# Patient Record
Sex: FEMALE | Race: WHITE | URBAN - METROPOLITAN AREA
[De-identification: names, ages, dates, MRNs, and addresses within clinical notes are randomized per-mention and may not be internally consistent; named-entity substitution may affect disease eponyms.]

---

## 2017-01-13 ENCOUNTER — OFFICE VISIT (OUTPATIENT)
Dept: ENDOCRINOLOGY | Facility: CLINIC | Age: 62
End: 2017-01-13

## 2017-01-13 VITALS
TEMPERATURE: 98.4 F | HEIGHT: 61 IN | WEIGHT: 201.8 LBS | BODY MASS INDEX: 38.1 KG/M2 | HEART RATE: 97 BPM | OXYGEN SATURATION: 93 % | DIASTOLIC BLOOD PRESSURE: 76 MMHG | SYSTOLIC BLOOD PRESSURE: 130 MMHG

## 2017-01-13 DIAGNOSIS — E66.01 MORBID OBESITY DUE TO EXCESS CALORIES (H): Primary | ICD-10-CM

## 2017-01-13 RX ORDER — TOPIRAMATE 25 MG/1
TABLET, FILM COATED ORAL
Qty: 90 TABLET | Refills: 5 | Status: SHIPPED | OUTPATIENT
Start: 2017-01-13 | End: 2017-04-24

## 2017-01-13 ASSESSMENT — PAIN SCALES - GENERAL: PAINLEVEL: SEVERE PAIN (7)

## 2017-01-13 NOTE — NURSING NOTE
"Chief Complaint   Patient presents with     Clinic Care Coordination - Follow-up     [facility]       Filed Vitals:    01/13/17 1226   BP: 130/76   Pulse: 97   Temp: 98.4  F (36.9  C)   TempSrc: Oral   Height: 5' 1\"   Weight: 201 lb 12.8 oz   SpO2: 93%       Body mass index is 38.15 kg/(m^2).    Kellie ALONZO LPN                       "

## 2017-01-13 NOTE — PROGRESS NOTES
Return Medical Weight Management Note     Elham Naik  MRN:  [id number]  :  1955  DENIZ:  2017    Dear Dr. Stevenson,     I had the pleasure of seeing your patient Elham Naik.  She is a 60 year old female who I am continuing to see for treatment of obesity related to: DM-2, Hypertension, Hyperlipidemia, GERD, Depression and kidney disease     CURRENT WEIGHT:   201 lbs 12.8 oz    Wt Readings from Last 4 Encounters:   17 91.536 kg (201 lb 12.8 oz)   16 91.445 kg (201 lb 9.6 oz)   16 86.682 kg (191 lb 1.6 oz)   16 86.592 kg (190 lb 14.4 oz)     Height:  5'2  Body Mass Index:  Body mass index is 38.15 kg/(m^2).  Vitals:  B/P: 104/62, P: 89    Initial consult weight was 211  Weight change since last seen on 1/15/2016 is up 10 pounds.   Total loss is 10 pounds.    INTERVAL HISTORY:  Needed help with losing 20 lbs to get on the kidney transplant list, and now says that she is on it, but her kidney function is stable without dialysis. Still tolerating Victoza 1.2 mg.  Occasional GI side effects that she thinks may be related to Victoza. She wants to lose more weight related to kidney diease status but also for general health. Does not now think that GLP-1 will be enough and would like to consider an additional medication.    Diet and Activity Changes Since Last Visit Reviewed With Patient 2017   I have made the following changes to my diet since my last visit: -   With regards to my diet, I am still struggling with: carbbs,sugars   For breakfast, I typically eat: 2 toast,1hard boiled egg   For lunch, I typically eat: cicken salad sandwich,some fish   For supper, I typically eat: it vaaries   For snack(s), I typically eat: choclate iceream cookis   I have made the following changes to my activity/exercise since my last visit: not much   With regards to my activity/exercise, I am still struggling with: walking joint pain       MEDICATIONS:   Current Outpatient Prescriptions    Medication     escitalopram (LEXAPRO) 5 MG/5ML solution     trolamine salicylate (ASPERCREME) 10 % cream     liraglutide (VICTOZA) 18 MG/3ML soln     insulin pen needle (B-D U/F) 31G X 5 MM     trolamine salicylate (ASPERCREME) 10 % cream     aspirin 81 MG tablet     CALCIUM CARBONATE PO     cholecalciferol 1000 UNITS TABS     codeine 15 MG tablet     hydrOXYzine (ATARAX) 50 MG tablet     INSULIN ASPART SC     insulin glargine (LANTUS) 100 UNIT/ML vial     levothyroxine (SYNTHROID, LEVOTHROID) 50 MCG tablet     meclizine (ANTIVERT) 12.5 MG tablet     MELATONIN PO     insulin aspart (NOVOLOG FLEXPEN) 100 UNIT/ML soln     insulin aspart (NOVOLOG FLEXPEN) 100 UNIT/ML soln     simvastatin (ZOCOR) 40 MG tablet     Metoprolol Succinate (TOPROL XL PO)     cetirizine (ZYRTEC ALLERGY) 10 MG tablet     No current facility-administered medications for this visit.       Weight Loss Medication History Reviewed With Patient 1/13/2017   Which weight loss medications are you currently taking on a regular basis?  Victoza (liraglutide)   Are you having any side effects from the weight loss medication that we have prescribed you? No   If you are having side effects please describe: -     ASSESSMENT:   Patient hoping for more weight loss. Continue Victoza 1.2 mg daily. Maintain Lantus and Novolog at current doses. Will trial addition of topiramate for support of food change - intent/AEs reviewed. Reinforced food plan - focus on no between meal snacking, aggressive lowering of starches and cheese    FOLLOW-UP:    12 weeks.  10/15 minutes spent on counseling and education    Sincerely,    Easton Trejo MD

## 2017-01-13 NOTE — Clinical Note
2017       RE: Elham Naik  [address]  [address] [place] [address]     Dear Colleague,    Thank you for referring your patient, Elham Naik, to the MEDICAL WEIGHT MANAGEMENT at [facility]. Please see a copy of my visit note below.        Return Medical Weight Management Note     Elham Naik  MRN:  [id number]  :  1955  DENIZ:  2017    Dear Dr. Stevenson,     I had the pleasure of seeing your patient Elham Naik.  She is a 60 year old female who I am continuing to see for treatment of obesity related to: DM-2, Hypertension, Hyperlipidemia, GERD, Depression and kidney disease     CURRENT WEIGHT:   201 lbs 12.8 oz    Wt Readings from Last 4 Encounters:   17 91.536 kg (201 lb 12.8 oz)   16 91.445 kg (201 lb 9.6 oz)   16 86.682 kg (191 lb 1.6 oz)   16 86.592 kg (190 lb 14.4 oz)     Height:  5'2  Body Mass Index:  Body mass index is 38.15 kg/(m^2).  Vitals:  B/P: 104/62, P: 89    Initial consult weight was 211  Weight change since last seen on 1/15/2016 is up 10 pounds.   Total loss is 10 pounds.    INTERVAL HISTORY:  Needed help with losing 20 lbs to get on the kidney transplant list, and now says that she is on it, but her kidney function is stable without dialysis. Still tolerating Victoza 1.2 mg.  Occasional GI side effects that she thinks may be related to Victoza. She wants to lose more weight related to kidney diease status but also for general health. Does not now think that GLP-1 will be enough and would like to consider an additional medication.    Diet and Activity Changes Since Last Visit Reviewed With Patient 2017   I have made the following changes to my diet since my last visit: -   With regards to my diet, I am still struggling with: carbbs,sugars   For breakfast, I typically eat: 2 toast,1hard boiled egg   For lunch, I typically eat: cicken salad sandwich,some fish   For supper, I  typically eat: it vaaries   For snack(s), I typically eat: choclate iceream cookis   I have made the following changes to my activity/exercise since my last visit: not much   With regards to my activity/exercise, I am still struggling with: walking joint pain       MEDICATIONS:   Current Outpatient Prescriptions   Medication     escitalopram (LEXAPRO) 5 MG/5ML solution     trolamine salicylate (ASPERCREME) 10 % cream     liraglutide (VICTOZA) 18 MG/3ML soln     insulin pen needle (B-D U/F) 31G X 5 MM     trolamine salicylate (ASPERCREME) 10 % cream     aspirin 81 MG tablet     CALCIUM CARBONATE PO     cholecalciferol 1000 UNITS TABS     codeine 15 MG tablet     hydrOXYzine (ATARAX) 50 MG tablet     INSULIN ASPART SC     insulin glargine (LANTUS) 100 UNIT/ML vial     levothyroxine (SYNTHROID, LEVOTHROID) 50 MCG tablet     meclizine (ANTIVERT) 12.5 MG tablet     MELATONIN PO     insulin aspart (NOVOLOG FLEXPEN) 100 UNIT/ML soln     insulin aspart (NOVOLOG FLEXPEN) 100 UNIT/ML soln     simvastatin (ZOCOR) 40 MG tablet     Metoprolol Succinate (TOPROL XL PO)     cetirizine (ZYRTEC ALLERGY) 10 MG tablet     No current facility-administered medications for this visit.       Weight Loss Medication History Reviewed With Patient 1/13/2017   Which weight loss medications are you currently taking on a regular basis?  Victoza (liraglutide)   Are you having any side effects from the weight loss medication that we have prescribed you? No   If you are having side effects please describe: -     ASSESSMENT:   Patient hoping for more weight loss. Continue Victoza 1.2 mg daily. Maintain Lantus and Novolog at current doses. Will trial addition of topiramate for support of food change - intent/AEs reviewed. Reinforced food plan - focus on no between meal snacking, aggressive lowering of starches and cheese    FOLLOW-UP:    12 weeks.  10/15 minutes spent on counseling and education    Sincerely,      Easton Trejo MD

## 2017-01-13 NOTE — PATIENT INSTRUCTIONS
Follow up in 3 months with Dr. Trejo   MEDICATION STARTED AT THIS APPOINTMENT  We are starting topiramate at bedtime.  Start one tab, 25 mg, for a week. Go up to 50 mg (2 tabs) for the next week. At the third week, take   3 tabs (75 mg).  Stay at 3 tabs until you are seen again. Call the nurse at [phone] if you have any questions or concerns. (Do not stop taking it if you don't think it's working. For some people it works even though they do not feel much different.)    Topiramate (Topamax) is a medication that is used most often to treat migraine headaches or for seizures. It has also been found to help with weight loss. Although it's not currently FDA approved for weight loss, it has been used safely for a number of years to help people who are carrying extra weight.     Just how topiramate helps with weight loss has not been exactly determined. However it seems to work on areas of the brain to quiet down signals related to eating.      Topiramate may make you:    >feel less interest in eating in between meals   >think less about food and eating   >find it easier to push the plate away   >find giving up pop easier    >have an easier time eating less    For some of our patients, the pills work right away. They feel and think quite differently about food. Other patients don't feel much of a change but find in fact they have lost weight! Like all weight loss medications, topiramate works best when you help it work.  This means:    1) Have less tempting high calorie (fattening) food around the house or office    2) Have lower calorie food (fruits, vegetables,low fat meats and dairy) for snacks    3) Eat out only one time or less each week.   4) Eat your meals at a table with the TV or computer off.    Side-effects. Topiramate is generally well tolerated. The main side-effects we see are:   Tingling in hands,feet, or face (usually not very troublesome)   Mental confusion and word finding trouble (about 10% of  patients have this.)     Feeling sleepy or a bit dopey- this goes away very soon after starting.    One of the dangers of topiramate is the possibility of birth defects--if you get pregnant when you are on it, there is the risk that your baby will be born with a cleft lip or palate.  If you are on topiramate and of child bearing age, you need to be on a reliable form of birth control or refrain from sexual intercourse.     Please refer to the pharmacy insert for more information on side-effects. Since many pharmacists are not familiar with the use of topiramate in weight loss, calling the clinic will get you the most accurate information on the use of this medication for weight loss.     In order to get refills of this or any medication we prescribe you must be seen in the medical weight mgmt clinic every 2-3 months. Please have your pharmacy fax a refill request to [phone].

## 2017-02-01 ENCOUNTER — TRANSFERRED RECORDS (OUTPATIENT)
Dept: HEALTH INFORMATION MANAGEMENT | Facility: CLINIC | Age: 62
End: 2017-02-01

## 2017-04-24 ENCOUNTER — OFFICE VISIT (OUTPATIENT)
Dept: ENDOCRINOLOGY | Facility: CLINIC | Age: 62
End: 2017-04-24

## 2017-04-24 VITALS
BODY MASS INDEX: 37.91 KG/M2 | HEART RATE: 104 BPM | HEIGHT: 61 IN | DIASTOLIC BLOOD PRESSURE: 84 MMHG | OXYGEN SATURATION: 97 % | TEMPERATURE: 98.1 F | WEIGHT: 200.8 LBS | SYSTOLIC BLOOD PRESSURE: 156 MMHG

## 2017-04-24 DIAGNOSIS — E66.01 MORBID OBESITY, UNSPECIFIED OBESITY TYPE (H): ICD-10-CM

## 2017-04-24 DIAGNOSIS — E66.01 MORBID OBESITY DUE TO EXCESS CALORIES (H): ICD-10-CM

## 2017-04-24 RX ORDER — LIRAGLUTIDE 6 MG/ML
INJECTION SUBCUTANEOUS
Qty: 9 ML | Refills: 5 | Status: SHIPPED | OUTPATIENT
Start: 2017-04-24

## 2017-04-24 RX ORDER — TOPIRAMATE 25 MG/1
50 TABLET, FILM COATED ORAL 2 TIMES DAILY
Qty: 120 TABLET | Refills: 5 | Status: SHIPPED | OUTPATIENT
Start: 2017-04-24

## 2017-04-24 ASSESSMENT — PAIN SCALES - GENERAL: PAINLEVEL: EXTREME PAIN (8)

## 2017-04-24 NOTE — LETTER
2017       RE: Elham Naik  [address]  [address] [place] [address]     Dear Colleague,    Thank you for referring your patient, Elham Naik, to the [facility] MEDICAL WEIGHT MANAGEMENT at [facility]. Please see a copy of my visit note below.        Return Medical Weight Management Note     Elham Naik  MRN:  [id number]  :  1955  DENIZ:  2017    Dear Dr. Stevenson,     I had the pleasure of seeing your patient Elham Naik.  She is a 60 year old female who I am continuing to see for treatment of obesity related to: DM-2, Hypertension, Hyperlipidemia, GERD, Depression and kidney disease     CURRENT WEIGHT:   200 lbs 12.8 oz    Wt Readings from Last 4 Encounters:   17 91.1 kg (200 lb 12.8 oz)   17 91.5 kg (201 lb 12.8 oz)   16 91.4 kg (201 lb 9.6 oz)   16 86.7 kg (191 lb 1.6 oz)     Height:  5'2  Body Mass Index:  Body mass index is 37.94 kg/(m^2).  Vitals:  B/P: 104/62, P: 89    Initial consult weight was 211  Weight change since last seen on 2017 is down 1 pounds.   Total loss is 11 pounds.    INTERVAL HISTORY:  Needed help with losing 20 lbs to get on the kidney transplant list, and now says that she is on it, but her kidney function is stable without dialysis. Now tolerating Victoza 1.8 mg.  Has not been able to change her food much as is being told has to eat to catch up with her insulin. She wants to lose more weight related to kidney diease status but also for general health.     Diet and Activity Changes Since Last Visit Reviewed With Patient 2017   I have made the following changes to my diet since my last visit: Less sugar, less fat   With regards to my diet, I am still struggling with: Cravings   For breakfast, I typically eat: 2 pieces toast   For lunch, I typically eat: Grilled cheese sandwich, chicken salad croissant   For supper, I typically eat: Fish, chicken   For snack(s), I  typically eat: Ritz crackers - 2 or 3, occasional candy bar   I have made the following changes to my activity/exercise since my last visit: None   With regards to my activity/exercise, I am still struggling with: Pain in knees and low back       MEDICATIONS:   Current Outpatient Prescriptions   Medication     topiramate (TOPAMAX) 25 MG tablet     escitalopram (LEXAPRO) 5 MG/5ML solution     trolamine salicylate (ASPERCREME) 10 % cream     liraglutide (VICTOZA) 18 MG/3ML soln     insulin pen needle (B-D U/F) 31G X 5 MM     trolamine salicylate (ASPERCREME) 10 % cream     aspirin 81 MG tablet     CALCIUM CARBONATE PO     cholecalciferol 1000 UNITS TABS     codeine 15 MG tablet     hydrOXYzine (ATARAX) 50 MG tablet     INSULIN ASPART SC     insulin glargine (LANTUS) 100 UNIT/ML vial     levothyroxine (SYNTHROID, LEVOTHROID) 50 MCG tablet     meclizine (ANTIVERT) 12.5 MG tablet     MELATONIN PO     insulin aspart (NOVOLOG FLEXPEN) 100 UNIT/ML soln     insulin aspart (NOVOLOG FLEXPEN) 100 UNIT/ML soln     simvastatin (ZOCOR) 40 MG tablet     Metoprolol Succinate (TOPROL XL PO)     cetirizine (ZYRTEC ALLERGY) 10 MG tablet     No current facility-administered medications for this visit.        Weight Loss Medication History Reviewed With Patient 4/24/2017   Which weight loss medications are you currently taking on a regular basis?  Topamax (topiramate), Victoza (liraglutide)   Are you having any side effects from the weight loss medication that we have prescribed you? No   If you are having side effects please describe: -     ASSESSMENT:   Patient hoping for more weight loss. Continue Victoza 1.8 mg daily. Maintain Lantus at current dose and stop Novolog now that is at full dose of liraglutide. Will increase topiramate to 50 BID for support of food change. Reinforced food plan - focus on no between meal snacking, aggressive lowering of starches and cheese.    FOLLOW-UP:    12 weeks.  10/15 minutes spent on counseling and  education    Sincerely,    Easton Trejo MD

## 2017-04-24 NOTE — NURSING NOTE
"(   Chief Complaint   Patient presents with     Weight Loss     follow up for weight management    )    ( Weight: 200 lb 12.8 oz )  ( Height: 5' 1\" )  ( BMI (Calculated): 38.02 )  (   )  (   )  (   )  (   )  (   )  (   )    ( BP: 156/84 )  (   )  ( Temp: 98.1  F (36.7  C) )  ( Temp src: Oral )  ( Pulse: 104 )  (   )  ( SpO2: 97 % )    (   Patient Active Problem List   Diagnosis     CKD (chronic kidney disease) stage 4, GFR 15-29 ml/min (H)     Diabetes mellitus, type 2 (H)     Hypothyroidism     Vitamin D deficiency     HTN (hypertension)     Depression    )  (   Current Outpatient Prescriptions   Medication Sig Dispense Refill     topiramate (TOPAMAX) 25 MG tablet 25mg at bedtime for week 1, 50mg at bedtime for 1 week, and 75mg at bedtime thereafter 90 tablet 5     escitalopram (LEXAPRO) 5 MG/5ML solution Take 10 mg by mouth daily       trolamine salicylate (ASPERCREME) 10 % cream        liraglutide (VICTOZA) 18 MG/3ML soln 1.2 MG Daily 9 mL 4     insulin pen needle (B-D U/F) 31G X 5 MM Use 1 pen needles daily or as directed with Victoza 100 each 1     trolamine salicylate (ASPERCREME) 10 % cream Apply topically as needed for moderate pain       aspirin 81 MG tablet Take 81 mg by mouth daily       CALCIUM CARBONATE PO Take by mouth as needed       cholecalciferol 1000 UNITS TABS Take 1,000 Units by mouth daily       codeine 15 MG tablet Take 15 mg by mouth every 8 hours as needed for moderate to severe pain       hydrOXYzine (ATARAX) 50 MG tablet Take 50 mg by mouth every 6 hours as needed for anxiety       INSULIN ASPART SC Inject 3 Units Subcutaneous as needed for high blood sugar       insulin glargine (LANTUS) 100 UNIT/ML vial Inject 38 Units Subcutaneous At Bedtime        levothyroxine (SYNTHROID, LEVOTHROID) 50 MCG tablet Take by mouth daily       meclizine (ANTIVERT) 12.5 MG tablet Take 12.5 mg by mouth 3 times daily as needed for dizziness       MELATONIN PO Take 6 mg by mouth At Bedtime       insulin " aspart (NOVOLOG FLEXPEN) 100 UNIT/ML soln Inject 16 Units Subcutaneous 2 times daily (with meals) Per sliding scale       insulin aspart (NOVOLOG FLEXPEN) 100 UNIT/ML soln Inject 18 Units Subcutaneous every evening       simvastatin (ZOCOR) 40 MG tablet Take by mouth At Bedtime       Metoprolol Succinate (TOPROL XL PO) Take 50 mg by mouth       cetirizine (ZYRTEC ALLERGY) 10 MG tablet Take 20 mg by mouth At Bedtime      )  ( Diabetes Eval:    )    ( Pain Eval:  Extreme Pain (8) )    (  History   Smoking Status     Never Smoker   Smokeless Tobacco     Not on file    )    ( Signed By:  Vanna Nelson; April 24, 2017; 10:50 AM )

## 2017-04-24 NOTE — MR AVS SNAPSHOT
After Visit Summary   2017    Elham Naik    MRN: [id number]           Patient Information     Date Of Birth          1955        Visit Information        Provider Department      2017 10:45 AM Easton Trejo MD [facility] Medical Weight Management        Today's Diagnoses     Morbid obesity due to excess calories (H)        Morbid obesity, unspecified obesity type (H)           Follow-ups after your visit        Follow-up notes from your care team     Return in about 3 months (around 2017).      Who to contact     Please call your clinic at [phone] to:    Ask questions about your health    Make or cancel appointments    Discuss your medicines    Learn about your test results    Speak to your doctor   If you have compliments or concerns about an experience at your clinic, or if you wish to file a complaint, please contact [facility] Patient Relations at [phone] or email us at [email or web address]         Additional Information About Your Visit        MyChart Information     S[employer]y is an electronic gateway that provides easy, online access to your medical records. With [employer]ells[employer], you can request a clinic appointment, read your test results, renew a prescription or communicate with your care team.     To sign up for [email or web address] visit the website at [email or web address]   You will be asked to enter the access code listed below, as well as some personal information. Please follow the directions to create your username and password.     Your access code is: [email or web address]  Expires: 2017 11:34 AM     Your access code will  in 90 days. If you need help or a new code, please contact your [facility] or call [phone] for assistance.        Care EveryWhere ID     This is your Care EveryWhere ID. This could be used by other organizations to access your [facility]  "records  [id number]        Your Vitals Were     Pulse Temperature Height Last Period Pulse Oximetry BMI (Body Mass Index)    104 98.1  F (36.7  C) (Oral) 1.549 m (5' 1\") (LMP Unknown) 97% 37.94 kg/m2       Blood Pressure from Last 3 Encounters:   04/24/17 156/84   01/13/17 130/76   12/19/16 141/73    Weight from Last 3 Encounters:   04/24/17 91.1 kg (200 lb 12.8 oz)   01/13/17 91.5 kg (201 lb 12.8 oz)   12/19/16 91.4 kg (201 lb 9.6 oz)              Today, you had the following     No orders found for display         Today's Medication Changes          These changes are accurate as of: 4/24/17 11:34 AM.  If you have any questions, ask your nurse or doctor.               These medicines have changed or have updated prescriptions.        Dose/Directions    liraglutide 18 MG/3ML soln   Commonly known as:  VICTOZA   This may have changed:  additional instructions   Used for:  Morbid obesity, unspecified obesity type (H)   Changed by:  Easton Trejo MD        1.8 MG Daily   Quantity:  9 mL   Refills:  5       topiramate 25 MG tablet   Commonly known as:  TOPAMAX   This may have changed:    - how much to take  - how to take this  - when to take this  - additional instructions   Used for:  Morbid obesity due to excess calories (H)   Changed by:  Easton Trejo MD        Dose:  50 mg   Take 2 tablets (50 mg) by mouth 2 times daily   Quantity:  120 tablet   Refills:  5            Where to get your medicines      These medications were sent to [facility] [place] - [address] [address]  [address] [address] [place] [address]     Phone:  [phone]     liraglutide 18 MG/3ML soln    topiramate 25 MG tablet                Primary Care Provider Office Phone # Fax #    Easton Trejo [phone]        [facility] [address] [facility] [address]  [facility] [address]        Thank you!     Thank you for choosing [facility] WEIGHT MANAGEMENT  for your care. " Our goal is always to provide you with excellent care. Hearing back from our patients is one way we can continue to improve our services. Please take a few minutes to complete the written survey that you may receive in the mail after your visit with us. Thank you!             Your Updated Medication List - Protect others around you: Learn how to safely use, store and throw away your medicines at www.disposemymeds.org.          This list is accurate as of: 4/24/17 11:34 AM.  Always use your most recent med list.                   Brand Name Dispense Instructions for use    aspirin 81 MG tablet      Take 81 mg by mouth daily       CALCIUM CARBONATE PO      Take by mouth as needed       cholecalciferol 1000 UNITS Tabs      Take 1,000 Units by mouth daily       codeine 15 MG tablet      Take 15 mg by mouth every 8 hours as needed for moderate to severe pain       escitalopram 5 MG/5ML solution    LEXAPRO     Take 10 mg by mouth daily       hydrOXYzine 50 MG tablet    ATARAX     Take 50 mg by mouth every 6 hours as needed for anxiety       INSULIN ASPART SC      Inject 3 Units Subcutaneous as needed for high blood sugar       insulin glargine 100 UNIT/ML injection    LANTUS     Inject 38 Units Subcutaneous At Bedtime       insulin pen needle 31G X 5 MM    B-D U/F    100 each    Use 1 pen needles daily or as directed with Victoza       levothyroxine 50 MCG tablet    SYNTHROID/LEVOTHROID     Take by mouth daily       liraglutide 18 MG/3ML soln    VICTOZA    9 mL    1.8 MG Daily       meclizine 12.5 MG tablet    ANTIVERT     Take 12.5 mg by mouth 3 times daily as needed for dizziness       MELATONIN PO      Take 6 mg by mouth At Bedtime       simvastatin 40 MG tablet    ZOCOR     Take by mouth At Bedtime       topiramate 25 MG tablet    TOPAMAX    120 tablet    Take 2 tablets (50 mg) by mouth 2 times daily       TOPROL XL PO      Take 50 mg by mouth       * trolamine salicylate 10 % cream    ASPERCREME     Apply topically  as needed for moderate pain       * trolamine salicylate 10 % cream    ASPERCREME         ZYRTEC ALLERGY 10 MG tablet   Generic drug:  cetirizine      Take 20 mg by mouth At Bedtime       * Notice:  This list has 2 medication(s) that are the same as other medications prescribed for you. Read the directions carefully, and ask your doctor or other care provider to review them with you.

## 2017-04-24 NOTE — PROGRESS NOTES
Return Medical Weight Management Note     Elham Naik  MRN:  [id number]  :  1955  DENIZ:  2017    Dear Dr. Stevenson,     I had the pleasure of seeing your patient Elham Naik.  She is a 60 year old female who I am continuing to see for treatment of obesity related to: DM-2, Hypertension, Hyperlipidemia, GERD, Depression and kidney disease     CURRENT WEIGHT:   200 lbs 12.8 oz    Wt Readings from Last 4 Encounters:   17 91.1 kg (200 lb 12.8 oz)   17 91.5 kg (201 lb 12.8 oz)   16 91.4 kg (201 lb 9.6 oz)   16 86.7 kg (191 lb 1.6 oz)     Height:  5'2  Body Mass Index:  Body mass index is 37.94 kg/(m^2).  Vitals:  B/P: 104/62, P: 89    Initial consult weight was 211  Weight change since last seen on 2017 is down 1 pounds.   Total loss is 11 pounds.    INTERVAL HISTORY:  Needed help with losing 20 lbs to get on the kidney transplant list, and now says that she is on it, but her kidney function is stable without dialysis. Now tolerating Victoza 1.8 mg.  Has not been able to change her food much as is being told has to eat to catch up with her insulin. She wants to lose more weight related to kidney diease status but also for general health.     Diet and Activity Changes Since Last Visit Reviewed With Patient 2017   I have made the following changes to my diet since my last visit: Less sugar, less fat   With regards to my diet, I am still struggling with: Cravings   For breakfast, I typically eat: 2 pieces toast   For lunch, I typically eat: Grilled cheese sandwich, chicken salad croissant   For supper, I typically eat: Fish, chicken   For snack(s), I typically eat: Ritz crackers - 2 or 3, occasional candy bar   I have made the following changes to my activity/exercise since my last visit: None   With regards to my activity/exercise, I am still struggling with: Pain in knees and low back       MEDICATIONS:   Current Outpatient Prescriptions   Medication     topiramate  (TOPAMAX) 25 MG tablet     escitalopram (LEXAPRO) 5 MG/5ML solution     trolamine salicylate (ASPERCREME) 10 % cream     liraglutide (VICTOZA) 18 MG/3ML soln     insulin pen needle (B-D U/F) 31G X 5 MM     trolamine salicylate (ASPERCREME) 10 % cream     aspirin 81 MG tablet     CALCIUM CARBONATE PO     cholecalciferol 1000 UNITS TABS     codeine 15 MG tablet     hydrOXYzine (ATARAX) 50 MG tablet     INSULIN ASPART SC     insulin glargine (LANTUS) 100 UNIT/ML vial     levothyroxine (SYNTHROID, LEVOTHROID) 50 MCG tablet     meclizine (ANTIVERT) 12.5 MG tablet     MELATONIN PO     insulin aspart (NOVOLOG FLEXPEN) 100 UNIT/ML soln     insulin aspart (NOVOLOG FLEXPEN) 100 UNIT/ML soln     simvastatin (ZOCOR) 40 MG tablet     Metoprolol Succinate (TOPROL XL PO)     cetirizine (ZYRTEC ALLERGY) 10 MG tablet     No current facility-administered medications for this visit.        Weight Loss Medication History Reviewed With Patient 4/24/2017   Which weight loss medications are you currently taking on a regular basis?  Topamax (topiramate), Victoza (liraglutide)   Are you having any side effects from the weight loss medication that we have prescribed you? No   If you are having side effects please describe: -     ASSESSMENT:   Patient hoping for more weight loss. Continue Victoza 1.8 mg daily. Maintain Lantus at current dose and stop Novolog now that is at full dose of liraglutide. Will increase topiramate to 50 BID for support of food change. Reinforced food plan - focus on no between meal snacking, aggressive lowering of starches and cheese.    FOLLOW-UP:    12 weeks.  10/15 minutes spent on counseling and education    Sincerely,    Easton Trejo MD

## 2017-05-02 ENCOUNTER — TRANSFERRED RECORDS (OUTPATIENT)
Dept: HEALTH INFORMATION MANAGEMENT | Facility: CLINIC | Age: 62
End: 2017-05-02

## 2017-05-02 ENCOUNTER — TELEPHONE (OUTPATIENT)
Dept: ENDOCRINOLOGY | Facility: CLINIC | Age: 62
End: 2017-05-02

## 2017-05-02 DIAGNOSIS — E11.65 TYPE 2 DIABETES MELLITUS WITH HYPERGLYCEMIA, UNSPECIFIED LONG TERM INSULIN USE STATUS: Primary | ICD-10-CM

## 2017-05-02 NOTE — TELEPHONE ENCOUNTER
Nurse from care facility called with concerns that patient is not being truthful during visit of food intake. States they find many candy/ chocolate wrappers in her garbage. She also eats out often too. Blood glucose has been ranging from 187 - 310 since Novalog was discontinued. Encouraged Nurse to make appointment to follow up with endocrinologist.

## 2017-05-05 NOTE — TELEPHONE ENCOUNTER
HIMANSHU Trejo:  Restart Novolog 3 units TID with meals. Continue Victoza 1.8 mg daily.       Spoke to Tana at NH and orders given. New Rx sent to pharmacy per [facility] nurse protocol/Dr. Trejo.  Tana Craig RN, BSN

## 2017-06-17 ENCOUNTER — HEALTH MAINTENANCE LETTER (OUTPATIENT)
Age: 62
End: 2017-06-17

## 2017-08-18 ENCOUNTER — MEDICAL CORRESPONDENCE (OUTPATIENT)
Dept: TRANSPLANT | Facility: CLINIC | Age: 62
End: 2017-08-18

## 2017-09-18 ENCOUNTER — TELEPHONE (OUTPATIENT)
Dept: TRANSPLANT | Facility: CLINIC | Age: 62
End: 2017-09-18

## 2017-09-18 NOTE — TELEPHONE ENCOUNTER
Coordinator spoke with pt. Pt reports she is now over 200 lbs. Reviewed that when she met with  Dr. De Dios on 12/19/16 he listed her goal weight to be 185 lbs. Pt states she was seen by our weight management clinic but the physician took her off her insulin medications and her A1C went above 8 and gained weight. Pt does not want to be seen at our weight management clinic again. Explained that pt is not required to go to our weight management clinic. Pt will need to show some weight loss progression in the next 6 months or she status will be discussed at our kidney selection committee. Asked pt to call coordinator if she meets her goal weight prior to 6 months. Pt states she is not in a wheel chair and never has been. Reviewed once weight gain is obtained she will need to complete other items for activation consideration (currently: nephrology, mammogram, GI f/u per Dr. De Dios for chronic diarrhea, SW and complete an updated HLA). Confirmed pt's address.    Spoke with NYDIA Rose at pt's nursing home. Pt's last weight was taken on 08/12/17 and was 207.6lbs.  Reviewed above information that was reviewed with pt.    BMI letter to be mailed to pt.

## 2017-10-06 ENCOUNTER — TRANSFERRED RECORDS (OUTPATIENT)
Dept: HEALTH INFORMATION MANAGEMENT | Facility: CLINIC | Age: 62
End: 2017-10-06

## 2017-10-12 ENCOUNTER — OFFICE VISIT (OUTPATIENT)
Dept: OPHTHALMOLOGY | Facility: CLINIC | Age: 62
End: 2017-10-12
Attending: OPHTHALMOLOGY
Payer: MEDICARE

## 2017-10-12 DIAGNOSIS — H26.491 RIGHT POSTERIOR CAPSULAR OPACIFICATION: ICD-10-CM

## 2017-10-12 DIAGNOSIS — H35.372 EPIRETINAL MEMBRANE (ERM) OF LEFT EYE: Primary | ICD-10-CM

## 2017-10-12 DIAGNOSIS — Z96.1 PSEUDOPHAKIA OF BOTH EYES: ICD-10-CM

## 2017-10-12 PROCEDURE — 92015 DETERMINE REFRACTIVE STATE: CPT | Mod: GY,ZF

## 2017-10-12 PROCEDURE — 76519 ECHO EXAM OF EYE: CPT | Mod: ZF | Performed by: OPHTHALMOLOGY

## 2017-10-12 PROCEDURE — 99213 OFFICE O/P EST LOW 20 MIN: CPT | Mod: ZF

## 2017-10-12 ASSESSMENT — VISUAL ACUITY
CORRECTION_TYPE: GLASSES
OS_CC: 20/50
METHOD: SNELLEN - LINEAR
OD_CC: 20/40

## 2017-10-12 ASSESSMENT — REFRACTION_WEARINGRX
OS_CYLINDER: +1.25
OS_SPHERE: -0.75
OS_AXIS: 039
OD_CYLINDER: +1.00
OD_SPHERE: -0.50
OD_AXIS: 150

## 2017-10-12 ASSESSMENT — TONOMETRY
OD_IOP_MMHG: 14
OS_IOP_MMHG: 17
IOP_METHOD: TONOPEN

## 2017-10-12 ASSESSMENT — REFRACTION_MANIFEST
OD_AXIS: 150
OD_SPHERE: -0.50
OS_ADD: +3.00
OS_SPHERE: -1.00
OD_CYLINDER: +1.00
OD_ADD: +3.00
OS_CYLINDER: +1.25
OS_AXIS: 040

## 2017-10-12 ASSESSMENT — SLIT LAMP EXAM - LIDS
COMMENTS: NORMAL
COMMENTS: NORMAL

## 2017-10-12 ASSESSMENT — CUP TO DISC RATIO: OD_RATIO: 0.3

## 2017-10-12 ASSESSMENT — CONF VISUAL FIELD
OS_NORMAL: 1
OD_NORMAL: 1

## 2017-10-12 ASSESSMENT — EXTERNAL EXAM - LEFT EYE: OS_EXAM: NORMAL

## 2017-10-12 ASSESSMENT — EXTERNAL EXAM - RIGHT EYE: OD_EXAM: NORMAL

## 2017-10-12 NOTE — PROGRESS NOTES
A 62 year F  Referred by Dr. Silva for possible IOL exchange OD    HPI: history of worsening vision likely in OD since . Dr. Silva attempted yag capsulotomy OD recently without any benefit.    history of CATARACT EXTRACTION with IOL OD 7 yrs prior  S/p yag capsulotomy OD  history of Diabetes mellitus s/p focal laser OD and PRP both eyes      Medications: AT as needed    Assessment and plan:    Pseudophakia both eyes  Glistening's OD  S/p yag capsulotomy OD  History of Diabetes mellitus s/p focal laser OD and PRP both eyes    Wondering if  vision is due macular issues or glistenings (infact vision in OD better than OS)  Will discuss this with her retina doc  It will be a high risk surgery, s/p capsulotomy    RETURN TO CLINIC in 1 month    SHANTI Singh  Cornea fellow

## 2017-10-12 NOTE — LETTER
10/12/2017       RE: Elham Naik  [address]  [address] [place] [address]     Dear Colleagues,    Thank you for referring your patient, Elham Naik, to the EYE CLINIC at [facility]. Please see a copy of my visit note below.    ~~~~~~~~~~~~~~~~~~~~~~~~~~~~~~~~~~~~~~~~~~~~~~~~~~~~~~~~~      A 62 year F  Referred by Dr. Silva for possible IOL exchange right eye.  Seen by Dr. Jacques for proliferative diabetic retinopathy.    HPI: history of worsening vision  OD since April'17. Dr. Silva attempted yag capsulotomy OD recently without any benefit.    history of CATARACT EXTRACTION with IOL OD 7 yrs prior  S/p yag capsulotomy right eye - no improvement in vision  history of Diabetes mellitus s/p focal laser OD and PRP both eyes      A/P    Speak with Dr. Jacques re: level of severity of Diabetic retinopathy against acuity measurements  Counseled re: complexity of intraocular lens exchange & possible risks    Acuity very similar between right eye and left eye despite heavy asymmetry in glistenings right eye > left eye ; capsulotomy present; intraocular lens in place 7 years all give me pause    Biometry today    Will consult with Dr. Jacques and reassess with patient      ~~~~~~~~~~~~~~~~~~~~~~~~~~~~~~~~~~~~~~~~~~~~~~~~~~~~~~~~~~~~~~~~    With her acuity at 20/40 in the right eye and 20/50 in the left eye, the presence of focal laser scars in the macula right eye and advanced proliferative diabetic retinopathy both eyes, I'm not sure how visually significant the intraocular lens glistenings really are.  They are markedly asymmetric between the two eyes despite the acuities being very similar.  I have reservations about the risk of an intraocular lens explant / exchange so I hope you might be able to weigh in on her visual potential, macular function, etc.    Please give me a call at the number below at your convenience to discuss.          Again, thank  you for allowing me to participate in the care of your patient.      Sincerely,        Keith Lane MD, MA  Director, Cornea & Anterior Segment  Director, Fellowship in Cornea & External Disease  [facility] Department of Ophthalmology & Visual Neuroscience  : Radha Florian [phone]  Email: [email or web address]  Mobile: [phone]

## 2017-10-12 NOTE — PROGRESS NOTES
62 year old F    Vision changes right eye since April    History Diabetic retinopathy with injections and laser (PDR s/p Panretinal laser photocoagulation (PRP), focal laser right eye )    S/p phacoemulsification with intraocular lens approx 7 years ago    S/p yag cap right eye   S/p intraocular lens opacity YAG by Dr. Silva - no change    A/P    Speak with Dr. Jacques re: level of severity of Diabetic retinopathy against acuity measurements  Counseled re: complexity of intraocular lens exchange & possible risks    Acuity very similar between right eye and left eye despite heavy asymmetry in glistenings right eye > left eye ; capsulotomy present; intraocular lens in place 7 years all give me pause    Biometry today    Will consult with Dr. Jacques and reassess with patient      ~~~~~~~~~~~~~~~~~~~~~~~~~~~~~~~~~~~~~~~~~~~~~~~~~~~~~~~~~~~~~~~~    Complete documentation of historical and exam elements from today's encounter can be found in the full encounter summary report (not reduplicated in this progress note). I personally obtained the chief complaint(s) and history of present illness.  I confirmed and edited as necessary the review of systems, past medical/surgical history, family history, social history, and examination findings as documented by others.  I examined the patient myself, and I personally reviewed the relevant tests, images, and reports as documented above. I formulated and edited as necessary the assessment and plan and discussed the findings and management plan with the patient and family.     I personally interpreted the diagnostic / imaging study and have edited the interpretation as needed.    Keith Lane MD, MA  Director, Cornea & Anterior Segment  [facility] Department of Ophthalmology & Visual Neuroscience

## 2017-10-12 NOTE — MR AVS SNAPSHOT
After Visit Summary   10/12/2017    Elham Naik    MRN: [id number]           Patient Information     Date Of Birth          1955        Visit Information        Provider Department      10/12/2017 1:30 PM Keith Lane MD Eye Clinic        Today's Diagnoses     Epiretinal membrane (ERM) of left eye - Left Eye    -  1    Pseudophakia of both eyes - Both Eyes        Right posterior capsular opacification - Right Eye           Follow-ups after your visit        Who to contact     Please call your clinic at [phone] to:    Ask questions about your health    Make or cancel appointments    Discuss your medicines    Learn about your test results    Speak to your doctor   If you have compliments or concerns about an experience at your clinic, or if you wish to file a complaint, please contact [facility] Patient Relations at [phone] or email us at [email or web address]         Additional Information About Your Visit        MyChart Information     [employer] is an electronic gateway that provides easy, online access to your medical records. With [employer], you can request a clinic appointment, read your test results, renew a prescription or communicate with your care team.     To sign up for [employer] visit the website at [email or web address]   You will be asked to enter the access code listed below, as well as some personal information. Please follow the directions to create your username and password.     Your access code is: [email or web address]  Expires: 2018  6:31 AM     Your access code will  in 90 days. If you need help or a new code, please contact your [facility] or call [phone] for assistance.        Care EveryWhere ID     This is your Care EveryWhere ID. This could be used by other organizations to access your [facility] medical records  [id number]        Your Vitals Were     Last Period                    (LMP Unknown)            Blood Pressure from Last 3 Encounters:   04/24/17 156/84   01/13/17 130/76   12/19/16 141/73    Weight from Last 3 Encounters:   08/12/17 94.2 kg (207 lb 9.6 oz)   04/24/17 91.1 kg (200 lb 12.8 oz)   01/13/17 91.5 kg (201 lb 12.8 oz)              We Performed the Following     IOL Biometry w/ IOL calc OU (both eye)        Primary Care Provider    None Specified       No primary provider on file.        Equal Access to Services     [facility] : Hadii vicente Lock, waalleyda luqadaha, qaybta kaalmaclau agee, vanessa carlisle . So [facility] [phone].    ATENCIÓN: Si habla español, tiene a watters disposición servicios gratuitos de asistencia lingüística. Llame al [phone].    We comply with applicable federal civil rights laws and [place] laws. We do not discriminate on the basis of race, color, national origin, age, disability, sex, sexual orientation, or gender identity.            Thank you!     Thank you for choosing EYE CLINIC  for your care. Our goal is always to provide you with excellent care. Hearing back from our patients is one way we can continue to improve our services. Please take a few minutes to complete the written survey that you may receive in the mail after your visit with us. Thank you!             Your Updated Medication List - Protect others around you: Learn how to safely use, store and throw away your medicines at www.disposemymeds.org.          This list is accurate as of: 10/12/17 11:59 PM.  Always use your most recent med list.                   Brand Name Dispense Instructions for use Diagnosis    aspirin 81 MG tablet      Take 81 mg by mouth daily        CALCIUM CARBONATE PO      Take by mouth as needed        cholecalciferol 1000 UNITS Tabs      Take 1,000 Units by mouth daily        codeine 15 MG tablet      Take 15 mg by mouth every 8 hours as needed for moderate to severe pain        escitalopram 5 MG/5ML solution    LEXAPRO      Take 10 mg by mouth daily        hydrOXYzine 50 MG tablet    ATARAX     Take 50 mg by mouth every 6 hours as needed for anxiety        * INSULIN ASPART SC      Inject 3 Units Subcutaneous as needed for high blood sugar        * insulin aspart 100 UNIT/ML injection    NovoLOG FLEXPEN    3 mL    Inject 3 Units Subcutaneous 3 times daily (with meals) Per sliding scale    Type 2 diabetes mellitus with hyperglycemia, unspecified long term insulin use status (H)       insulin glargine 100 UNIT/ML injection    LANTUS     Inject 38 Units Subcutaneous At Bedtime        insulin pen needle 31G X 5 MM    B-D U/F    100 each    Use 1 pen needles daily or as directed with Victoza    Obesity       levothyroxine 50 MCG tablet    SYNTHROID/LEVOTHROID     Take by mouth daily        liraglutide 18 MG/3ML soln    VICTOZA    9 mL    1.8 MG Daily    Morbid obesity, unspecified obesity type (H)       meclizine 12.5 MG tablet    ANTIVERT     Take 12.5 mg by mouth 3 times daily as needed for dizziness        MELATONIN PO      Take 6 mg by mouth At Bedtime        simvastatin 40 MG tablet    ZOCOR     Take by mouth At Bedtime        topiramate 25 MG tablet    TOPAMAX    120 tablet    Take 2 tablets (50 mg) by mouth 2 times daily    Morbid obesity due to excess calories (H)       TOPROL XL PO      Take 50 mg by mouth        * trolamine salicylate 10 % cream    ASPERCREME     Apply topically as needed for moderate pain        * trolamine salicylate 10 % cream    ASPERCREME          ZYRTEC ALLERGY 10 MG tablet   Generic drug:  cetirizine      Take 20 mg by mouth At Bedtime        * Notice:  This list has 4 medication(s) that are the same as other medications prescribed for you. Read the directions carefully, and ask your doctor or other care provider to review them with you.

## 2017-10-12 NOTE — NURSING NOTE
Chief Complaints and History of Present Illnesses   Patient presents with     COMPREHENSIVE EYE EXAM     s/p Possible IOL exchange     HPI    Affected eye(s):  Right   Symptoms:     Blurred vision   Decreased vision   Double vision   Floaters   No flashes      Frequency:  Constant       Do you have eye pain now?:  No      Comments:  Pt stated increasing  blurry vision RE over the last 6 months, LE stable vision.   A1C 6.7 9/2017                       A1C                      6.9                 05/03/2016                 A1C                      8.0                 05/11/2015   BS: 110-170             Margarito Sherman  1:42 PM October 12, 2017

## 2017-12-05 ENCOUNTER — TRANSFERRED RECORDS (OUTPATIENT)
Dept: HEALTH INFORMATION MANAGEMENT | Facility: CLINIC | Age: 62
End: 2017-12-05

## 2017-12-11 ENCOUNTER — TRANSFERRED RECORDS (OUTPATIENT)
Dept: HEALTH INFORMATION MANAGEMENT | Facility: CLINIC | Age: 62
End: 2017-12-11

## 2017-12-12 ENCOUNTER — TRANSFERRED RECORDS (OUTPATIENT)
Dept: HEALTH INFORMATION MANAGEMENT | Facility: CLINIC | Age: 62
End: 2017-12-12

## 2018-02-21 ENCOUNTER — MEDICAL CORRESPONDENCE (OUTPATIENT)
Dept: TRANSPLANT | Facility: CLINIC | Age: 63
End: 2018-02-21

## 2018-02-21 VITALS — WEIGHT: 204.81 LBS | HEIGHT: 61 IN | BODY MASS INDEX: 38.67 KG/M2

## 2018-02-26 ENCOUNTER — TELEPHONE (OUTPATIENT)
Dept: TRANSPLANT | Facility: CLINIC | Age: 63
End: 2018-02-26

## 2018-02-26 VITALS — WEIGHT: 206.8 LBS | BODY MASS INDEX: 39.04 KG/M2 | HEIGHT: 61 IN

## 2018-02-28 ENCOUNTER — TEAM CONFERENCE (OUTPATIENT)
Dept: TRANSPLANT | Facility: CLINIC | Age: 63
End: 2018-02-28

## 2018-02-28 ENCOUNTER — TELEPHONE (OUTPATIENT)
Dept: TRANSPLANT | Facility: CLINIC | Age: 63
End: 2018-02-28

## 2018-02-28 NOTE — TELEPHONE ENCOUNTER
TEAM CONFERENCE:    ATTENDEES: Heather Rivera Riad, Keys, Schumaker, Schenk, Coordinators, Social Work, Pharmacy, Finance, and Dietary    DISCUSSION and OUTCOME: Selection committee agreed pt to come off of kidney wait list. Pt may come back as a referral once she has met her goal BMI of 35 (goal weight of 185lbs).

## 2018-02-28 NOTE — LETTER
February 28, 2018    Elham Naik  [facility]  [facility]  [address] [place] [address]      Dear Ms. Naik,    The purpose of this letter is to let you know the [facility] Multi-Disciplinary Selection Team made the decision to remove you from the kidney transplant list.  This is because you have not made progress towards your goal BMI of 35 (goal weight of <185 lbs). We welcome you back as a referral once you are able to reach your goal BMI.  Important things you should know:    If you would like to discuss the decision, or if your medical status changes, you may call [phone] and ask to speak to your transplant coordinator.    We recommend that you continue to follow up with your primary care and referring physicians in order to manage your health concerns.  Enclosed is a letter from [employer] which describes the services offered to patients by [employer] and the [employer] Network.  Thank you for allowing us to participate in your care.  We wish you well.    Sincerely,    Kidney Transplant Team  Enclosure:  [employer] Letter     CC:  Eduardo Lopez; Queta Virgen MD; Gagandeep Garcia MD

## 2018-03-07 ENCOUNTER — TELEPHONE (OUTPATIENT)
Dept: OPHTHALMOLOGY | Facility: CLINIC | Age: 63
End: 2018-03-07

## 2018-03-07 NOTE — TELEPHONE ENCOUNTER
Dr. Lane paged to contact Dr. Jacques at  about possible surgery on mutal pt  Office number provided for dr. Jacques [phone]

## 2018-06-05 ENCOUNTER — OFFICE VISIT (OUTPATIENT)
Dept: OPHTHALMOLOGY | Facility: CLINIC | Age: 63
End: 2018-06-05
Attending: OPHTHALMOLOGY
Payer: MEDICARE

## 2018-06-05 DIAGNOSIS — T85.9XXS: ICD-10-CM

## 2018-06-05 DIAGNOSIS — H04.123 DRY EYE SYNDROME OF BOTH LACRIMAL GLANDS: ICD-10-CM

## 2018-06-05 DIAGNOSIS — Z96.1 PSEUDOPHAKIA OF BOTH EYES: Primary | ICD-10-CM

## 2018-06-05 PROCEDURE — 68761 CLOSE TEAR DUCT OPENING: CPT | Mod: ZF,50 | Performed by: OPHTHALMOLOGY

## 2018-06-05 PROCEDURE — G0463 HOSPITAL OUTPT CLINIC VISIT: HCPCS | Mod: ZF

## 2018-06-05 ASSESSMENT — VISUAL ACUITY
OD_BAT_LOW: 20/40
METHOD: SNELLEN - LINEAR
OS_SC+: +2
OS_CC: 20/40
OD_SC: 20/40
OS_BAT_MED: 20/50-2
OS_BAT_LOW: 20/40
OS_BAT_HIGH: 20/60-1
OS_CC+: +2
CORRECTION_TYPE: GLASSES
OD_BAT_HIGH: 20/150
OD_BAT_MED: 20/60+2
OD_CC: 20/30
OD_CC+: -1
OS_SC: 20/40
OS_PH_CC: 20/30-2

## 2018-06-05 ASSESSMENT — EXTERNAL EXAM - RIGHT EYE: OD_EXAM: NORMAL

## 2018-06-05 ASSESSMENT — REFRACTION_WEARINGRX
OS_CYLINDER: +0.50
OD_AXIS: 145
OS_SPHERE: -0.50
OD_SPHERE: +0.25
SPECS_TYPE: PAL
OS_AXIS: 045
OD_CYLINDER: +0.25

## 2018-06-05 ASSESSMENT — TONOMETRY
OS_IOP_MMHG: 11
OD_IOP_MMHG: 10
IOP_METHOD: TONOPEN

## 2018-06-05 ASSESSMENT — EXTERNAL EXAM - LEFT EYE: OS_EXAM: NORMAL

## 2018-06-05 ASSESSMENT — CUP TO DISC RATIO
OS_RATIO: 0.25
OD_RATIO: 0.3

## 2018-06-05 ASSESSMENT — CONF VISUAL FIELD
OS_NORMAL: 1
METHOD: COUNTING FINGERS
OD_NORMAL: 1

## 2018-06-05 ASSESSMENT — SLIT LAMP EXAM - LIDS
COMMENTS: NORMAL
COMMENTS: NORMAL

## 2018-06-05 NOTE — PROGRESS NOTES
62 year old F    Feels vision has been stable but is still not as clear as the left.      History Diabetic retinopathy with injections and laser (PDR s/p Panretinal laser photocoagulation (PRP), focal laser right eye )    States A1c is doing well    S/p phacoemulsification with intraocular lens approx 7 years ago    S/p yag cap right eye   S/p intraocular lens opacity YAG by Dr. Silva - no change    Gtts:  none    A/P    Dr. Jacques counseled patient that her retina is in good enough shape and would recommend a pre-op injection if lens exchange is decided    Counseled re: complexity of intraocular lens exchange & possible risks    Diffuse punctate epithelial erosions likely contributing to vision.  Discussed importance of dry eye control with patient.    Start PF ATs 6x/day    Place punctal plugs BILATERAL LOWER EYELIDS today    Biometry from October    Plan:    General anesthesia per patient pref  90 min  intraocular lens explant with sulcus intraocular lens vs capsulectomy / vitrectomy with Boy Zurita, DO  Cornea Fellow      ~~~~~~~~~~~~~~~~~~~~~~~~~~~~~~~~~~~~~~~~~~~~~~~~~~~~~~~~~~~~~~~~    Complete documentation of historical and exam elements from today's encounter can be found in the full encounter summary report (not reduplicated in this progress note). I personally obtained the chief complaint(s) and history of present illness.  I confirmed and edited as necessary the review of systems, past medical/surgical history, family history, social history, and examination findings as documented by others.  I examined the patient myself, and I personally reviewed the relevant tests, images, and reports as documented above. I formulated and edited as necessary the assessment and plan and discussed the findings and management plan with the patient and family.     I was personally present for the entirety of the in-office procedure.     Keith Lane MD, MA  Director, Cornea & Anterior  Segment  [facility] Department of Ophthalmology & Visual Neuroscience

## 2018-06-05 NOTE — NURSING NOTE
Chief Complaints and History of Present Illnesses   Patient presents with     Follow Up For     Possible IOL exchange RE     HPI    Affected eye(s):  Both   Symptoms:     No floaters   No flashes   No redness   No tearing   Dryness (Comment: Dryness in BE, relief with drops.)         Do you have eye pain now?:  No      Comments:  Pt states that her vision is the same as last visit. Pt seeing double vision (diagonally). Pt closes her RE to read. Pt takes her glasses off to watch TV.  DM2 BS: 186 this morning.  Lab Results       Component                Value               Date                     A1C Good per pt, taken about 2 months ago.   A1C                      6.9                 05/03/2016                 A1C                      8.0                 05/11/2015                Yaron Murphy [facility] June 5, 2018 1:46 PM

## 2018-06-05 NOTE — MR AVS SNAPSHOT
After Visit Summary   6/5/2018    Elham Naik    MRN: [id number]           Patient Information     Date Of Birth          1955        Visit Information        Provider Department      6/5/2018 1:15 PM Keith Lane MD [facility]        Today's Diagnoses     Pseudophakia of both eyes    -  1    Dry eye syndrome of both lacrimal glands        Complication of intraocular lens implant, sequela           Follow-ups after your visit        Your next 10 appointments already scheduled     Jun 25, 2018   Procedure with Keith Lane MD   [facility] (--)    [address]  [facility] [address]   [phone]            Jun 26, 2018 10:15 AM CDT   Post-Op with Keith Lane MD   [facility] ([facility])    [address] [address]   [phone]            Jul 02, 2018 10:00 AM CDT   Post-Op with Ilan Zurita DO   [facility] Clinic ([facility])    [address] [address]   [phone]            Jul 24, 2018  1:45 PM CDT   Post-Op with Keith Lane MD   [facility] ([facility])    [address] [address]   [phone]              Who to contact     Please call your clinic at [phone] to:    Ask questions about your health    Make or cancel appointments    Discuss your medicines    Learn about your test results    Speak to your doctor            Additional Information About Your Visit        Patronpathhart Information     Xtraice is an electronic gateway that provides easy, online access to your medical records. With Xtraice, you can request a clinic appointment, read your test results, renew a prescription or communicate with your care team.     To sign up for Xtraice visit the website at [email or web address]   You will  be asked to enter the access code listed below, as well as some personal information. Please follow the directions to create your username and password.     Your access code is: [email or web address]  Expires: 2018  6:31 AM     Your access code will  in 90 days. If you need help or a new code, please contact your [facility] or call [phone] for assistance.        Care EveryWhere ID     This is your Care EveryWhere ID. This could be used by other organizations to access your [place] medical records  [id number]        Your Vitals Were     Last Period                   (LMP Unknown)            Blood Pressure from Last 3 Encounters:   17 156/84   17 130/76   16 141/73    Weight from Last 3 Encounters:   18 93.8 kg (206 lb 12.8 oz)   17 92.9 kg (204 lb 12.9 oz)   17 91.1 kg (200 lb 12.8 oz)              We Performed the Following     Brightness Acuity Testing (BAT)     Ayla-Operative Worksheet     Punctal Closure, Plugs        Primary Care Provider Office Phone # Fax #    [facility] [place] [facility] [phone] [address] [place] [address]        Equal Access to Services     VALERIE IYER : Hadii vicente ku hadasho Soomaali, waaxda luqadaha, qaybta kaalmada adeegyada, vanessa brionesin hayashley villa. So [facility] [phone].    ATENCIÓN: Si habla español, tiene a watters disposición servicios gratuitos de asistencia lingüística. ame al [phone].    We comply with applicable federal civil rights laws and [place] laws. We do not discriminate on the basis of race, color, national origin, age, disability, sex, sexual orientation, or gender identity.            Thank you!     Thank you for choosing [facility]  for your care. Our goal is always to provide you with excellent care. Hearing back from our patients is one way we can continue to improve our services. Please take a few minutes to complete  the written survey that you may receive in the mail after your visit with us. Thank you!             Your Updated Medication List - Protect others around you: Learn how to safely use, store and throw away your medicines at www.disposemymeds.org.          This list is accurate as of 6/5/18 11:59 PM.  Always use your most recent med list.                   Brand Name Dispense Instructions for use Diagnosis    aspirin 81 MG tablet      Take 81 mg by mouth daily        CALCIUM CARBONATE PO      Take by mouth as needed        cholecalciferol 1000 units Tabs      Take 1,000 Units by mouth daily        codeine 15 MG tablet      Take 15 mg by mouth every 8 hours as needed for moderate to severe pain        escitalopram 5 MG/5ML solution    LEXAPRO     Take 10 mg by mouth daily        hydrOXYzine 50 MG tablet    ATARAX     Take 50 mg by mouth every 6 hours as needed for anxiety        * INSULIN ASPART SC      Inject 3 Units Subcutaneous as needed for high blood sugar        * insulin aspart 100 UNIT/ML injection    NovoLOG FLEXPEN    3 mL    Inject 3 Units Subcutaneous 3 times daily (with meals) Per sliding scale    Type 2 diabetes mellitus with hyperglycemia, unspecified long term insulin use status (H)       insulin glargine 100 UNIT/ML injection    LANTUS     Inject 38 Units Subcutaneous At Bedtime        insulin pen needle 31G X 5 MM    B-D U/F    100 each    Use 1 pen needles daily or as directed with Victoza    Obesity       levothyroxine 50 MCG tablet    SYNTHROID/LEVOTHROID     Take by mouth daily        liraglutide 18 MG/3ML soln    VICTOZA    9 mL    1.8 MG Daily    Morbid obesity, unspecified obesity type (H)       meclizine 12.5 MG tablet    ANTIVERT     Take 12.5 mg by mouth 3 times daily as needed for dizziness        MELATONIN PO      Take 6 mg by mouth At Bedtime        simvastatin 40 MG tablet    ZOCOR     Take by mouth At Bedtime        topiramate 25 MG tablet    TOPAMAX    120 tablet    Take 2 tablets (50  mg) by mouth 2 times daily    Morbid obesity due to excess calories (H)       TOPROL XL PO      Take 50 mg by mouth        * trolamine salicylate 10 % cream    ASPERCREME     Apply topically as needed for moderate pain        * trolamine salicylate 10 % cream    ASPERCREME          ZYRTEC ALLERGY 10 MG tablet   Generic drug:  cetirizine      Take 20 mg by mouth At Bedtime        * Notice:  This list has 4 medication(s) that are the same as other medications prescribed for you. Read the directions carefully, and ask your doctor or other care provider to review them with you.

## 2018-06-06 ENCOUNTER — TELEPHONE (OUTPATIENT)
Dept: OPHTHALMOLOGY | Facility: CLINIC | Age: 63
End: 2018-06-06

## 2018-06-06 NOTE — TELEPHONE ENCOUNTER
[facility] Call Center    Phone Message    May a detailed message be left on voicemail: yes    Reason for Call: Other: The HUC from the home the pt lives in, [place], states the pt came back to the home without a checkout summary, she was empty handed. Please fax a copy to fax [phone]. Please call [phone] if needed    Taken: Message routed to:  [facility]

## 2018-06-07 ENCOUNTER — TELEPHONE (OUTPATIENT)
Dept: OPHTHALMOLOGY | Facility: CLINIC | Age: 63
End: 2018-06-07

## 2018-06-07 NOTE — TELEPHONE ENCOUNTER
Reviewed with care facility facilitator stated had pt's paperwork filled out and handed to pt after visit    Reviewed surgery scheduler will be calling to review surgical plan  Ede Lanier RN 9:45 AM 06/07/18

## 2018-06-21 DIAGNOSIS — T85.29XD MECHANICAL COMPLICATION DUE TO INTRAOCULAR LENS IMPLANT, SUBSEQUENT ENCOUNTER: Primary | ICD-10-CM

## 2018-06-21 RX ORDER — KETOROLAC TROMETHAMINE 5 MG/ML
1 SOLUTION OPHTHALMIC 4 TIMES DAILY
Qty: 1 BOTTLE | Refills: 0 | Status: SHIPPED | OUTPATIENT
Start: 2018-06-21

## 2018-06-21 RX ORDER — PREDNISOLONE ACETATE 10 MG/ML
1 SUSPENSION/ DROPS OPHTHALMIC 4 TIMES DAILY
Qty: 1 BOTTLE | Refills: 0 | Status: SHIPPED | OUTPATIENT
Start: 2018-06-21

## 2018-06-21 RX ORDER — OFLOXACIN 3 MG/ML
1 SOLUTION/ DROPS OPHTHALMIC 4 TIMES DAILY
Qty: 1 BOTTLE | Refills: 0 | Status: SHIPPED | OUTPATIENT
Start: 2018-06-21

## 2018-06-22 RX ORDER — LOPERAMIDE HCL 2 MG
2 CAPSULE ORAL 4 TIMES DAILY PRN
COMMUNITY

## 2018-06-22 RX ORDER — CODEINE PHOSPHATE AND GUAIFENESIN 10; 100 MG/5ML; MG/5ML
1 SOLUTION ORAL 2 TIMES DAILY PRN
COMMUNITY

## 2018-06-22 RX ORDER — FERROUS SULFATE 325(65) MG
325 TABLET, DELAYED RELEASE (ENTERIC COATED) ORAL DAILY
COMMUNITY

## 2018-06-25 ENCOUNTER — SURGERY (OUTPATIENT)
Age: 63
End: 2018-06-25

## 2018-06-25 ENCOUNTER — ANESTHESIA EVENT (OUTPATIENT)
Dept: SURGERY | Facility: CLINIC | Age: 63
End: 2018-06-25
Payer: MEDICARE

## 2018-06-25 ENCOUNTER — HOSPITAL ENCOUNTER (OUTPATIENT)
Facility: CLINIC | Age: 63
Discharge: HOME | End: 2018-06-25
Attending: OPHTHALMOLOGY | Admitting: OPHTHALMOLOGY
Payer: MEDICARE

## 2018-06-25 ENCOUNTER — ANESTHESIA (OUTPATIENT)
Dept: SURGERY | Facility: CLINIC | Age: 63
End: 2018-06-25
Payer: MEDICARE

## 2018-06-25 VITALS
RESPIRATION RATE: 16 BRPM | SYSTOLIC BLOOD PRESSURE: 140 MMHG | OXYGEN SATURATION: 95 % | DIASTOLIC BLOOD PRESSURE: 72 MMHG | TEMPERATURE: 97 F | HEIGHT: 62 IN | WEIGHT: 212 LBS | BODY MASS INDEX: 39.01 KG/M2

## 2018-06-25 DIAGNOSIS — T85.21XD: Primary | ICD-10-CM

## 2018-06-25 LAB
GLUCOSE BLDC GLUCOMTR-MCNC: 131 MG/DL (ref 70–99)
GLUCOSE BLDC GLUCOMTR-MCNC: 149 MG/DL (ref 70–99)

## 2018-06-25 PROCEDURE — 25000125 ZZHC RX 250: Performed by: NURSE ANESTHETIST, CERTIFIED REGISTERED

## 2018-06-25 PROCEDURE — 25000128 H RX IP 250 OP 636: Performed by: NURSE ANESTHETIST, CERTIFIED REGISTERED

## 2018-06-25 PROCEDURE — 37000008 ZZH ANESTHESIA TECHNICAL FEE, 1ST 30 MIN: Performed by: OPHTHALMOLOGY

## 2018-06-25 PROCEDURE — 71000006 ZZH RECOVERY EYE PHASE 1 LEVEL 2 FIRST HR: Performed by: OPHTHALMOLOGY

## 2018-06-25 PROCEDURE — 25000128 H RX IP 250 OP 636: Performed by: OPHTHALMOLOGY

## 2018-06-25 PROCEDURE — 40000170 ZZH STATISTIC PRE-PROCEDURE ASSESSMENT II: Performed by: OPHTHALMOLOGY

## 2018-06-25 PROCEDURE — 25000125 ZZHC RX 250: Performed by: OPHTHALMOLOGY

## 2018-06-25 PROCEDURE — 25000566 ZZH SEVOFLURANE, EA 15 MIN: Performed by: OPHTHALMOLOGY

## 2018-06-25 PROCEDURE — 27210794 ZZH OR GENERAL SUPPLY STERILE: Performed by: OPHTHALMOLOGY

## 2018-06-25 PROCEDURE — V2632 POST CHMBR INTRAOCULAR LENS: HCPCS | Performed by: OPHTHALMOLOGY

## 2018-06-25 PROCEDURE — 71000028 ZZH EYE RECOVERY PHASE 2 EACH 15 MINS: Performed by: OPHTHALMOLOGY

## 2018-06-25 PROCEDURE — 37000009 ZZH ANESTHESIA TECHNICAL FEE, EACH ADDTL 15 MIN: Performed by: OPHTHALMOLOGY

## 2018-06-25 PROCEDURE — A9270 NON-COVERED ITEM OR SERVICE: HCPCS | Performed by: OPHTHALMOLOGY

## 2018-06-25 PROCEDURE — 82962 GLUCOSE BLOOD TEST: CPT

## 2018-06-25 PROCEDURE — 36000101 ZZH EYE SURGERY LEVEL 3 1ST 30 MIN: Performed by: OPHTHALMOLOGY

## 2018-06-25 PROCEDURE — 36000102 ZZH EYE SURGERY LEVEL 3 EA 15 ADDTL MIN: Performed by: OPHTHALMOLOGY

## 2018-06-25 DEVICE — IMPLANTABLE DEVICE: Type: IMPLANTABLE DEVICE | Site: EYE | Status: FUNCTIONAL

## 2018-06-25 RX ORDER — LIDOCAINE HYDROCHLORIDE 10 MG/ML
INJECTION, SOLUTION EPIDURAL; INFILTRATION; INTRACAUDAL; PERINEURAL PRN
Status: DISCONTINUED | OUTPATIENT
Start: 2018-06-25 | End: 2018-06-25 | Stop reason: HOSPADM

## 2018-06-25 RX ORDER — LIDOCAINE HYDROCHLORIDE 20 MG/ML
INJECTION, SOLUTION INFILTRATION; PERINEURAL PRN
Status: DISCONTINUED | OUTPATIENT
Start: 2018-06-25 | End: 2018-06-25

## 2018-06-25 RX ORDER — FENTANYL CITRATE 50 UG/ML
INJECTION, SOLUTION INTRAMUSCULAR; INTRAVENOUS PRN
Status: DISCONTINUED | OUTPATIENT
Start: 2018-06-25 | End: 2018-06-25

## 2018-06-25 RX ORDER — PREDNISOLONE ACETATE 1 %
SUSPENSION, DROPS(FINAL DOSAGE FORM)(ML) OPHTHALMIC (EYE) PRN
Status: DISCONTINUED | OUTPATIENT
Start: 2018-06-25 | End: 2018-06-25 | Stop reason: HOSPADM

## 2018-06-25 RX ORDER — SODIUM CHLORIDE, SODIUM LACTATE, POTASSIUM CHLORIDE, CALCIUM CHLORIDE 600; 310; 30; 20 MG/100ML; MG/100ML; MG/100ML; MG/100ML
INJECTION, SOLUTION INTRAVENOUS CONTINUOUS
Status: DISCONTINUED | OUTPATIENT
Start: 2018-06-25 | End: 2018-06-25 | Stop reason: HOSPADM

## 2018-06-25 RX ORDER — ONDANSETRON 2 MG/ML
INJECTION INTRAMUSCULAR; INTRAVENOUS PRN
Status: DISCONTINUED | OUTPATIENT
Start: 2018-06-25 | End: 2018-06-25

## 2018-06-25 RX ORDER — PHENYLEPHRINE HYDROCHLORIDE 25 MG/ML
1 SOLUTION/ DROPS OPHTHALMIC
Status: COMPLETED | OUTPATIENT
Start: 2018-06-25 | End: 2018-06-25

## 2018-06-25 RX ORDER — TETRACAINE HYDROCHLORIDE 5 MG/ML
SOLUTION OPHTHALMIC PRN
Status: DISCONTINUED | OUTPATIENT
Start: 2018-06-25 | End: 2018-06-25 | Stop reason: HOSPADM

## 2018-06-25 RX ORDER — PROPARACAINE HYDROCHLORIDE 5 MG/ML
1 SOLUTION/ DROPS OPHTHALMIC ONCE
Status: COMPLETED | OUTPATIENT
Start: 2018-06-25 | End: 2018-06-25

## 2018-06-25 RX ORDER — BALANCED SALT SOLUTION 6.4; .75; .48; .3; 3.9; 1.7 MG/ML; MG/ML; MG/ML; MG/ML; MG/ML; MG/ML
SOLUTION OPHTHALMIC PRN
Status: DISCONTINUED | OUTPATIENT
Start: 2018-06-25 | End: 2018-06-25 | Stop reason: HOSPADM

## 2018-06-25 RX ORDER — PROPOFOL 10 MG/ML
INJECTION, EMULSION INTRAVENOUS PRN
Status: DISCONTINUED | OUTPATIENT
Start: 2018-06-25 | End: 2018-06-25

## 2018-06-25 RX ORDER — LIDOCAINE 40 MG/G
CREAM TOPICAL
Status: DISCONTINUED | OUTPATIENT
Start: 2018-06-25 | End: 2018-06-25 | Stop reason: HOSPADM

## 2018-06-25 RX ORDER — TROPICAMIDE 10 MG/ML
1 SOLUTION/ DROPS OPHTHALMIC
Status: COMPLETED | OUTPATIENT
Start: 2018-06-25 | End: 2018-06-25

## 2018-06-25 RX ORDER — SODIUM CHLORIDE, SODIUM LACTATE, POTASSIUM CHLORIDE, CALCIUM CHLORIDE 600; 310; 30; 20 MG/100ML; MG/100ML; MG/100ML; MG/100ML
INJECTION, SOLUTION INTRAVENOUS CONTINUOUS PRN
Status: DISCONTINUED | OUTPATIENT
Start: 2018-06-25 | End: 2018-06-25

## 2018-06-25 RX ADMIN — CARBACHOL 0.5 ML: 0.1 SOLUTION OPHTHALMIC at 13:30

## 2018-06-25 RX ADMIN — BALANCED SALT SOLUTION 1 APPLICATOR: 6.4; .75; .48; .3; 3.9; 1.7 SOLUTION OPHTHALMIC at 13:27

## 2018-06-25 RX ADMIN — SODIUM CHONDROITIN SULFATE / SODIUM HYALURONATE 1 ML: 0.55-0.5 INJECTION INTRAOCULAR at 13:29

## 2018-06-25 RX ADMIN — LIDOCAINE HYDROCHLORIDE 1 ML: 10 INJECTION, SOLUTION EPIDURAL; INFILTRATION; INTRACAUDAL; PERINEURAL at 13:28

## 2018-06-25 RX ADMIN — EPINEPHRINE 500 ML: 1 INJECTION, SOLUTION, CONCENTRATE INTRAVENOUS at 13:28

## 2018-06-25 RX ADMIN — Medication 1 DROP: at 13:36

## 2018-06-25 RX ADMIN — ONDANSETRON 4 MG: 2 INJECTION INTRAMUSCULAR; INTRAVENOUS at 12:47

## 2018-06-25 RX ADMIN — PHENYLEPHRINE HYDROCHLORIDE 1 DROP: 2.5 SOLUTION/ DROPS OPHTHALMIC at 11:53

## 2018-06-25 RX ADMIN — TROPICAMIDE 1 DROP: 10 SOLUTION/ DROPS OPHTHALMIC at 11:53

## 2018-06-25 RX ADMIN — PROPARACAINE HYDROCHLORIDE 1 DROP: 5 SOLUTION/ DROPS OPHTHALMIC at 11:43

## 2018-06-25 RX ADMIN — MIDAZOLAM 2 MG: 1 INJECTION INTRAMUSCULAR; INTRAVENOUS at 12:24

## 2018-06-25 RX ADMIN — SODIUM CHLORIDE, POTASSIUM CHLORIDE, SODIUM LACTATE AND CALCIUM CHLORIDE: 600; 310; 30; 20 INJECTION, SOLUTION INTRAVENOUS at 13:01

## 2018-06-25 RX ADMIN — LIDOCAINE HYDROCHLORIDE 80 MG: 20 INJECTION, SOLUTION INFILTRATION; PERINEURAL at 12:32

## 2018-06-25 RX ADMIN — SODIUM CHLORIDE, POTASSIUM CHLORIDE, SODIUM LACTATE AND CALCIUM CHLORIDE: 600; 310; 30; 20 INJECTION, SOLUTION INTRAVENOUS at 12:24

## 2018-06-25 RX ADMIN — SUCCINYLCHOLINE CHLORIDE 100 MG: 20 INJECTION, SOLUTION INTRAMUSCULAR; INTRAVENOUS; PARENTERAL at 12:32

## 2018-06-25 RX ADMIN — PROPOFOL 200 MG: 10 INJECTION, EMULSION INTRAVENOUS at 12:32

## 2018-06-25 RX ADMIN — TROPICAMIDE 1 DROP: 10 SOLUTION/ DROPS OPHTHALMIC at 11:55

## 2018-06-25 RX ADMIN — TROPICAMIDE 1 DROP: 10 SOLUTION/ DROPS OPHTHALMIC at 11:43

## 2018-06-25 RX ADMIN — TETRACAINE HYDROCHLORIDE 1 DROP: 5 SOLUTION OPHTHALMIC at 13:29

## 2018-06-25 RX ADMIN — PHENYLEPHRINE HYDROCHLORIDE 1 DROP: 2.5 SOLUTION/ DROPS OPHTHALMIC at 11:55

## 2018-06-25 RX ADMIN — PHENYLEPHRINE HYDROCHLORIDE 1 DROP: 2.5 SOLUTION/ DROPS OPHTHALMIC at 11:43

## 2018-06-25 RX ADMIN — FENTANYL CITRATE 50 MCG: 50 INJECTION, SOLUTION INTRAMUSCULAR; INTRAVENOUS at 12:32

## 2018-06-25 ASSESSMENT — ENCOUNTER SYMPTOMS: SEIZURES: 0

## 2018-06-25 ASSESSMENT — LIFESTYLE VARIABLES: TOBACCO_USE: 0

## 2018-06-25 NOTE — OR NURSING
Telephone report was given to [facility].  Patient will be transported to Room. 9 via cart accompanied by NOLBERTO

## 2018-06-25 NOTE — OP NOTE
OPHTHALMOLOGY OPERATIVE REPORT    PATIENT NAME: Elham Naik   DATE: 6/25/2018     SURGEON:  Keith Lane MD  ASSISTANT  Ilan Zurita DO    PREOP DIAGNOSIS: mechanical complication of intraocular lens, right eye  POSTOP DIAGNOSIS: same    ANESTHESIA: General  COMPLICATIONS: none    LENS SPECIFICATIONS:  MA60AC +21.5 Diopters    INDICATION FOR PROCEDURE  The patient has a history of IOL glistenings causing visual disturbance. The risks including, but not limited to infection, loss of vision, loss of eye, need for more surgery, and bleeding, along with the benefits, alternatives, expectations, and the procedure itself were discussed at length with the patient who wished to proceed with surgery.    DESCRIPTION OF PROCEDURE  In the preoperative suite, the patient was identified, the surgical site marked and informed consent was obtained. The patient was then brought back to the operative suite where the appropriate anesthesia monitors were connected and general anesthesia was induced. The patient's eye was prepped and draped in the usual sterile fashion for ophthalmic surgery.      A scleral fixation ring and a supersharp blade were used to make three paracentesis incisions. Lidocaine was injected into the anterior chamber and then aqueous was replaced with viscoat in the anterior chamber. A 3.0 mm keratome was used to make a superior, triplanar clear corneal incision of approximately 3.2mm.     The IOL in the capsular bag was freed using a 30 gauge needle on viscoelastic and prolapsed into the sulcus.  The haptics were then cut and left in the capsular bag.  The optic was cut using intraocular cutters and removed.      The new 3-piece intraocular lens was injected into the sulcus and dialed into position so that the haptics were not overlying the old haptics that remained.  Viscoelastic was removed using the I/A handpiece and miostat was injected.  The intraocular lens was noted to be well centered and planar with a  round and regular pupil.  No vitreous was noted.      The wounds were hydrated and noted to be watertight at physiologic pressure.      Vigamox and prednisolone drops were given and a shield was taped over the eye.     The patient was then taken to the recovery room in stable condition having tolerated the procedure well and discharged home in good condition.    Dr. Keith Lane was scrubbed and present for the entire case.    I agree with the operative report as detailed above.  I personally performed the surgery and/or was scrubbed in for the operation in its entirety.    Keith Lane MD

## 2018-06-25 NOTE — IP AVS SNAPSHOT
[facility]    [address]    [place] [address]    Phone:  [phone]    Fax:  [phone]                                       After Visit Summary   6/25/2018    Elham Naik    MRN: [id number]           After Visit Summary Signature Page     I have received my discharge instructions, and my questions have been answered. I have discussed any challenges I see with this plan with the nurse or doctor.    ..........................................................................................................................................  Patient/Patient Representative Signature      ..........................................................................................................................................  Patient Representative Print Name and Relationship to Patient    ..................................................               ................................................  Date                                            Time    ..........................................................................................................................................  Reviewed by Signature/Title    ...................................................              ..............................................  Date                                                            Time

## 2018-06-25 NOTE — DISCHARGE INSTRUCTIONS
[facility]est[facility] Discharge  Instructions  Anesthesia ([facility] Center)   Adult Discharge Instructions (General)    For 24 hours after surgery    1. Get plenty of rest.  Make arrangements to have a responsible adult stay with you for at least 24 hours.  2. Do not drive or use heavy equipment for 24 hours.    3. Do not drink alcohol for 24 hours.  4. Do not sign legal documents or make important decisions for 24 hours.  5. Avoid strenuous or risky activities. You may feel lightheaded.  If so, sit for a few minutes before standing.  Have someone help you get up.   6. General anesthesia patients should drink only clear liquids (apple juice, ginger ale, broth or 7-Up). Be sure to drink enough fluids.  Move to a regular diet as you feel able.  7. Any questions of medical nature, call your physician.          [facility]  Post Operative Cataract Instructions    Do not rub the eye, keep the eye shield over the operated eye at night for one week.    Start taking eye drops today. Wait 3-4 minutes between drops.     Ocuflox 4x/day to surgical eye   Ketorolac 4x/day to surgical eye   Prednisolone 4x/day to surgical eye    No heavy lifting, no bending down at the waist, no water in the eye.    Take tylenol as directed on the bottle if you have pain.    Please call [phone] if you develop severe pain, decreased vision, redness, or severe sensitivity to light.    Bring your eye drops to your appointment tomorrow.    You have a follow-up appointment with your doctor tomorrow at the [facility] clinic.     Same Day Surgery Discharge Instructions for  Sedation and General Anesthesia       It's not unusual to feel dizzy, light-headed or faint for up to 24 hours after surgery or while taking pain medication.  If you have these symptoms: sit for a few minutes before standing and have someone assist you when you get up to walk or use the bathroom.      You should rest and relax  for the next 24 hours. We recommend you make arrangements to have an adult stay with you for at least 24 hours after your discharge.  Avoid hazardous and strenuous activity.      DO NOT DRIVE any vehicle or operate mechanical equipment for 24 hours following the end of your surgery.  Even though you may feel normal, your reactions may be affected by the medication you have received.      Do not drink alcoholic beverages for 24 hours following surgery.       Slowly progress to your regular diet as you feel able. It's not unusual to feel nauseated and/or vomit after receiving anesthesia.  If you develop these symptoms, drink clear liquids (apple juice, ginger ale, broth, 7-up, etc. ) until you feel better.  If your nausea and vomiting persists for 24 hours, please notify your surgeon.        All narcotic pain medications, along with inactivity and anesthesia, can cause constipation. Drinking plenty of liquids and increasing fiber intake will help.      For any questions of a medical nature, call your surgeon.      Do not make important decisions for 24 hours.      If you had general anesthesia, you may have a sore throat for a couple of days related to the breathing tube used during surgery.  You may use Cepacol lozenges to help with this discomfort.  If it worsens or if you develop a fever, contact your surgeon.       If you feel your pain is not well managed with the pain medications prescribed by your surgeon, please contact your surgeon's office to let them know so they can address your concerns.

## 2018-06-25 NOTE — ANESTHESIA PREPROCEDURE EVALUATION
Anesthesia Evaluation     . Pt has had prior anesthetic.     No history of anesthetic complications          ROS/MED HX    ENT/Pulmonary:      (-) tobacco use, sleep apnea and recent URI   Neurologic:      (-) seizures and CVA   Cardiovascular:     (+) hypertension----. : . . . :. .       METS/Exercise Tolerance:     Hematologic:         Musculoskeletal:         GI/Hepatic:     (+) GERD Asymptomatic on medication,       Renal/Genitourinary:     (+) chronic renal disease, type: CRI,       Endo:     (+) type II DM thyroid problem Obesity, .      Psychiatric:     (+) psychiatric history anxiety, depression and other (comment)      Infectious Disease:         Malignancy:         Other:                     Physical Exam  Normal systems: dental    Airway   Mallampati: II  TM distance: >3 FB  Neck ROM: full    Dental     Cardiovascular   Rhythm and rate: regular      Pulmonary    breath sounds clear to auscultation                    Anesthesia Plan      History & Physical Review  History and physical reviewed and following examination; no interval change.    ASA Status:  3 .        Plan for General and ETT with Intravenous induction. Maintenance will be Balanced.    PONV prophylaxis:  Ondansetron (or other 5HT-3)  Additional equipment: Videolaryngoscope      Postoperative Care  Postoperative pain management:  IV analgesics.      Consents  Anesthetic plan, risks, benefits and alternatives discussed with:  Patient..                          .

## 2018-06-25 NOTE — ANESTHESIA CARE TRANSFER NOTE
Patient: Elham Naik    Procedure(s):  RIGHT EYE INTRAOCULAR LENS EXPLANT WITH SULCUS INTRAOCULAR LENS EXCHANGE - Wound Class: I-Clean    Diagnosis: COMPLICATION OF IOL  Diagnosis Additional Information: No value filed.    Anesthesia Type:   General, ETT     Note:  Airway :Face Mask  Patient transferred to:PACU  Handoff Report: Identifed the Patient, Identified the Reponsible Provider, Reviewed the pertinent medical history, Discussed the surgical course, Reviewed Intra-OP anesthesia mangement and issues during anesthesia, Set expectations for post-procedure period and Allowed opportunity for questions and acknowledgement of understanding      Vitals: (Last set prior to Anesthesia Care Transfer)    [facility] VITALS  6/25/2018 1314 - 6/25/2018 1351      6/25/2018             Pulse: 93    Ht Rate: 91    SpO2: 95 %    Resp Rate (observed): (!)  4    Resp Rate (set): 10                Electronically Signed By: STACEY Carranza CRNA  June 25, 2018  1:51 PM

## 2018-06-25 NOTE — ANESTHESIA POSTPROCEDURE EVALUATION
Patient: Elham Naik    Procedure(s):  RIGHT EYE INTRAOCULAR LENS EXPLANT WITH SULCUS INTRAOCULAR LENS EXCHANGE - Wound Class: I-Clean    Diagnosis:COMPLICATION OF IOL  Diagnosis Additional Information: No value filed.    Anesthesia Type:  General, ETT    Note:  Anesthesia Post Evaluation    Patient location during evaluation: PACU  Patient participation: Able to fully participate in evaluation  Level of consciousness: awake  Pain management: adequate  Airway patency: patent  Cardiovascular status: acceptable  Respiratory status: acceptable  Hydration status: acceptable  PONV: none     Anesthetic complications: None          Last vitals:  Vitals:    06/25/18 1430 06/25/18 1445 06/25/18 1505   BP: 157/77 148/76 140/72   Resp:   16   Temp:  36.1  C (97  F)    SpO2: 94% 92% 95%         Electronically Signed By: Beverly Mauricio  June 25, 2018  3:54 PM

## 2018-06-25 NOTE — IP AVS SNAPSHOT
MRN:[id number]                      After Visit Summary   6/25/2018    Elham Naik    MRN: [id number]           Thank you!     Thank you for choosing [facility] for your care. Our goal is always to provide you with excellent care. Hearing back from our patients is one way we can continue to improve our services. Please take a few minutes to complete the written survey that you may receive in the mail after you visit with us. Thank you!        Patient Information     Date Of Birth          1955        About your hospital stay     You were admitted on:  June 25, 2018 You last received care in the:  [facility]    You were discharged on:  June 25, 2018       Who to Call     For medical emergencies, please call 911.  For non-urgent questions about your medical care, please call your primary care provider or clinic, [phone]  For questions related to your surgery, please call your surgery clinic        Attending Provider     Provider Specialty    Keith Lane MD Ophthalmology       Primary Care Provider Office Phone # Fax #    [facility] [phone]      Your next 10 appointments already scheduled     Jun 26, 2018 10:15 AM CDT   Post-Op with Keith Lane MD   Eye Clinic ([facility])    [address]   [address] [address]  [facility] [address]   [phone]            Jul 02, 2018 10:00 AM CDT   Post-Op with Ilan Zurita DO   Eye Clinic ([facility])    [address]   [address]  [facility] [address]   [phone]            Jul 24, 2018  1:45 PM CDT   Post-Op with Keith Lane MD   Eye Clinic ([facility])    Luis Gamez [address]  [address]  [facility] [address]   [phone]              Further instructions from your care team       [facility] Eye [facility]  Center Discharge  Instructions  Anesthesia (Eye Care Center)   Adult Discharge Instructions (General)    For 24 hours after surgery    1. Get plenty of rest.  Make arrangements to have a responsible adult stay with you for at least 24 hours.  2. Do not drive or use heavy equipment for 24 hours.    3. Do not drink alcohol for 24 hours.  4. Do not sign legal documents or make important decisions for 24 hours.  5. Avoid strenuous or risky activities. You may feel lightheaded.  If so, sit for a few minutes before standing.  Have someone help you get up.   6. General anesthesia patients should drink only clear liquids (apple juice, ginger ale, broth or 7-Up). Be sure to drink enough fluids.  Move to a regular diet as you feel able.  7. Any questions of medical nature, call your physician.          [facility]  Post Operative Cataract Instructions    Do not rub the eye, keep the eye shield over the operated eye at night for one week.    Start taking eye drops today. Wait 3-4 minutes between drops.     Ocuflox 4x/day to surgical eye   Ketorolac 4x/day to surgical eye   Prednisolone 4x/day to surgical eye    No heavy lifting, no bending down at the waist, no water in the eye.    Take tylenol as directed on the bottle if you have pain.    Please call [phone] if you develop severe pain, decreased vision, redness, or severe sensitivity to light.    Bring your eye drops to your appointment tomorrow.    You have a follow-up appointment with your doctor tomorrow at the [facility] Eye clinic.     Same Day Surgery Discharge Instructions for  Sedation and General Anesthesia       It's not unusual to feel dizzy, light-headed or faint for up to 24 hours after surgery or while taking pain medication.  If you have these symptoms: sit for a few minutes before standing and have someone assist you when you get up to walk or use the bathroom.      You should rest and relax for the next 24 hours. We recommend  "you make arrangements to have an adult stay with you for at least 24 hours after your discharge.  Avoid hazardous and strenuous activity.      DO NOT DRIVE any vehicle or operate mechanical equipment for 24 hours following the end of your surgery.  Even though you may feel normal, your reactions may be affected by the medication you have received.      Do not drink alcoholic beverages for 24 hours following surgery.       Slowly progress to your regular diet as you feel able. It's not unusual to feel nauseated and/or vomit after receiving anesthesia.  If you develop these symptoms, drink clear liquids (apple juice, ginger ale, broth, 7-up, etc. ) until you feel better.  If your nausea and vomiting persists for 24 hours, please notify your surgeon.        All narcotic pain medications, along with inactivity and anesthesia, can cause constipation. Drinking plenty of liquids and increasing fiber intake will help.      For any questions of a medical nature, call your surgeon.      Do not make important decisions for 24 hours.      If you had general anesthesia, you may have a sore throat for a couple of days related to the breathing tube used during surgery.  You may use Cepacol lozenges to help with this discomfort.  If it worsens or if you develop a fever, contact your surgeon.       If you feel your pain is not well managed with the pain medications prescribed by your surgeon, please contact your surgeon's office to let them know so they can address your concerns.           Pending Results     No orders found from 6/23/2018 to 6/26/2018.            Admission Information     Date & Time Provider Department Dept. Phone    6/25/2018 Page, Keith Ch MD [facility] [phone]      Your Vitals Were     Blood Pressure Temperature Respirations Height Weight Last Period    140/72 97  F (36.1  C) 16 1.568 m (5' 1.75\") 96.2 kg (212 lb) (LMP Unknown)    Pulse Oximetry BMI (Body Mass Index)                " "95% 39.09 kg/m2          [employer] Information     [employer] lets you send messages to your doctor, view your test results, renew your prescriptions, schedule appointments and more. To sign up, go to [email or web address] . Click on \"Log in\" on the left side of the screen, which will take you to the Welcome page. Then click on \"Sign up Now\" on the right side of the page.     You will be asked to enter the access code listed below, as well as some personal information. Please follow the directions to create your username and password.     Your access code is: [email or web address]  Expires: 2018  6:31 AM     Your access code will  in 90 days. If you need help or a new code, please call your [facility] clinic or [phone].        Care EveryWhere ID     This is your Care EveryWhere ID. This could be used by other organizations to access your [facility] medical records  [id number]        Equal Access to Services     VALERIE IYER : Hadii aad ku hadasho Sokym, waaxda luqadaha, qaybta kaalmada adeegyada, vanessa carlisle . So [facility] [phone].    ATENCIÓN: Si habla español, tiene a watters disposición servicios gratuitos de asistencia lingüística. Llame al [phone].    We comply with applicable federal civil rights laws and [place] laws. We do not discriminate on the basis of race, color, national origin, age, disability, sex, sexual orientation, or gender identity.               Review of your medicines      UNREVIEWED medicines. Ask your doctor about these medicines        Dose / Directions    aspirin 81 MG tablet        Dose:  81 mg   Take 81 mg by mouth daily   Refills:  0       CALCIUM CARBONATE PO        Take by mouth as needed   Refills:  0       cholecalciferol 1000 units Tabs        Dose:  1000 Units   Take 1,000 Units by mouth daily   Refills:  0       codeine 15 MG tablet        Dose:  15 mg   Take 15 mg by mouth At Bedtime   Refills:  0       escitalopram 5 MG/5ML solution "   Commonly known as:  LEXAPRO        Dose:  10 mg   Take 10 mg by mouth daily   Refills:  0       ferrous sulfate 325 (65 Fe) MG Tbec EC tablet        Dose:  325 mg   Take 325 mg by mouth daily   Refills:  0       FUROSEMIDE PO        Dose:  20 mg   Take 20 mg by mouth daily   Refills:  0       GABAPENTIN PO        Dose:  300 mg   Take 300 mg by mouth 3 times daily   Refills:  0       guaiFENesin-codeine 100-10 MG/5ML Soln solution   Commonly known as:  ROBITUSSIN AC        Dose:  1 tsp.   Take 1 tsp. by mouth 2 times daily as needed for cough   Refills:  0       hydrOXYzine 50 MG tablet   Commonly known as:  ATARAX        Dose:  50 mg   Take 50 mg by mouth every 6 hours as needed for anxiety   Refills:  0       * INSULIN ASPART SC        Dose:  3 Units   Inject 3 Units Subcutaneous as needed for high blood sugar   Refills:  0       * insulin aspart 100 UNIT/ML injection   Commonly known as:  NovoLOG FLEXPEN   Used for:  Type 2 diabetes mellitus with hyperglycemia, unspecified long term insulin use status (H)        Dose:  3 Units   Inject 3 Units Subcutaneous 3 times daily (with meals) Per sliding scale   Quantity:  3 mL   Refills:  5       insulin degludec 100 UNIT/ML pen   Commonly known as:  TRESIBA        Dose:  30 Units   Inject 30 Units Subcutaneous daily   Refills:  0       insulin glargine 100 UNIT/ML injection   Commonly known as:  LANTUS        Dose:  38 Units   Inject 38 Units Subcutaneous At Bedtime   Refills:  0       ketorolac 0.5 % ophthalmic solution   Commonly known as:  ACULAR   Used for:  Mechanical complication due to intraocular lens implant, subsequent encounter        Dose:  1 drop   Place 1 drop into the right eye 4 times daily   Quantity:  1 Bottle   Refills:  0       levothyroxine 50 MCG tablet   Commonly known as:  SYNTHROID/LEVOTHROID        Take by mouth daily   Refills:  0       liraglutide 18 MG/3ML soln   Commonly known as:  VICTOZA   Used for:  Morbid obesity, unspecified obesity  type (H)        1.8 MG Daily   Quantity:  9 mL   Refills:  5       loperamide 2 MG capsule   Commonly known as:  IMODIUM        Dose:  2 mg   Take 2 mg by mouth 4 times daily as needed for diarrhea   Refills:  0       meclizine 12.5 MG tablet   Commonly known as:  ANTIVERT        Dose:  12.5 mg   Take 12.5 mg by mouth 3 times daily as needed for dizziness   Refills:  0       MELATONIN PO        Dose:  10 mg   Take 10 mg by mouth At Bedtime   Refills:  0       ofloxacin 0.3 % ophthalmic solution   Commonly known as:  OCUFLOX   Used for:  Mechanical complication due to intraocular lens implant, subsequent encounter        Dose:  1 drop   Place 1 drop into the right eye 4 times daily   Quantity:  1 Bottle   Refills:  0       prednisoLONE acetate 1 % ophthalmic susp   Commonly known as:  PRED FORTE   Used for:  Mechanical complication due to intraocular lens implant, subsequent encounter        Dose:  1 drop   Place 1 drop into the right eye 4 times daily   Quantity:  1 Bottle   Refills:  0       simvastatin 40 MG tablet   Commonly known as:  ZOCOR        Take by mouth At Bedtime   Refills:  0       topiramate 25 MG tablet   Commonly known as:  TOPAMAX   Used for:  Morbid obesity due to excess calories (H)        Dose:  50 mg   Take 2 tablets (50 mg) by mouth 2 times daily   Quantity:  120 tablet   Refills:  5       TOPROL XL PO        Dose:  50 mg   Take 50 mg by mouth   Refills:  0       * trolamine salicylate 10 % cream   Commonly known as:  ASPERCREME        Apply topically as needed for moderate pain   Refills:  0       * trolamine salicylate 10 % cream   Commonly known as:  ASPERCREME        Refills:  0       VALIUM PO        Dose:  5 mg   Take 5 mg by mouth every 6 hours as needed for anxiety   Refills:  0       WHEAT DEXTRIN PO        Dose:  1 tablet   Take 1 tablet by mouth daily   Refills:  0       ZYRTEC ALLERGY 10 MG tablet   Generic drug:  cetirizine        Dose:  10 mg   Take 10 mg by mouth daily    Refills:  0       * Notice:  This list has 4 medication(s) that are the same as other medications prescribed for you. Read the directions carefully, and ask your doctor or other care provider to review them with you.      CONTINUE these medicines which have NOT CHANGED        Dose / Directions    insulin pen needle 31G X 5 MM   Commonly known as:  B-D U/F   Used for:  Obesity        Use 1 pen needles daily or as directed with Victoza   Quantity:  100 each   Refills:  1                Protect others around you: Learn how to safely use, store and throw away your medicines at www.disposemymeds.org.             Medication List: This is a list of all your medications and when to take them. Check marks below indicate your daily home schedule. Keep this list as a reference.      Medications           Morning Afternoon Evening Bedtime As Needed    aspirin 81 MG tablet   Take 81 mg by mouth daily                                CALCIUM CARBONATE PO   Take by mouth as needed                                cholecalciferol 1000 units Tabs   Take 1,000 Units by mouth daily                                codeine 15 MG tablet   Take 15 mg by mouth At Bedtime                                escitalopram 5 MG/5ML solution   Commonly known as:  LEXAPRO   Take 10 mg by mouth daily                                ferrous sulfate 325 (65 Fe) MG Tbec EC tablet   Take 325 mg by mouth daily                                FUROSEMIDE PO   Take 20 mg by mouth daily                                GABAPENTIN PO   Take 300 mg by mouth 3 times daily                                guaiFENesin-codeine 100-10 MG/5ML Soln solution   Commonly known as:  ROBITUSSIN AC   Take 1 tsp. by mouth 2 times daily as needed for cough                                hydrOXYzine 50 MG tablet   Commonly known as:  ATARAX   Take 50 mg by mouth every 6 hours as needed for anxiety                                * INSULIN ASPART SC   Inject 3 Units Subcutaneous as needed  for high blood sugar                                * insulin aspart 100 UNIT/ML injection   Commonly known as:  NovoLOG FLEXPEN   Inject 3 Units Subcutaneous 3 times daily (with meals) Per sliding scale                                insulin degludec 100 UNIT/ML pen   Commonly known as:  TRESIBA   Inject 30 Units Subcutaneous daily                                insulin glargine 100 UNIT/ML injection   Commonly known as:  LANTUS   Inject 38 Units Subcutaneous At Bedtime                                insulin pen needle 31G X 5 MM   Commonly known as:  B-D U/F   Use 1 pen needles daily or as directed with Victoza                                ketorolac 0.5 % ophthalmic solution   Commonly known as:  ACULAR   Place 1 drop into the right eye 4 times daily                                levothyroxine 50 MCG tablet   Commonly known as:  SYNTHROID/LEVOTHROID   Take by mouth daily                                liraglutide 18 MG/3ML soln   Commonly known as:  VICTOZA   1.8 MG Daily                                loperamide 2 MG capsule   Commonly known as:  IMODIUM   Take 2 mg by mouth 4 times daily as needed for diarrhea                                meclizine 12.5 MG tablet   Commonly known as:  ANTIVERT   Take 12.5 mg by mouth 3 times daily as needed for dizziness                                MELATONIN PO   Take 10 mg by mouth At Bedtime                                ofloxacin 0.3 % ophthalmic solution   Commonly known as:  OCUFLOX   Place 1 drop into the right eye 4 times daily   Last time this was given:  1 drop on 6/25/2018  1:36 PM                                prednisoLONE acetate 1 % ophthalmic susp   Commonly known as:  PRED FORTE   Place 1 drop into the right eye 4 times daily   Last time this was given:  1 drop on 6/25/2018  1:36 PM                                simvastatin 40 MG tablet   Commonly known as:  ZOCOR   Take by mouth At Bedtime                                topiramate 25 MG tablet    Commonly known as:  TOPAMAX   Take 2 tablets (50 mg) by mouth 2 times daily                                TOPROL XL PO   Take 50 mg by mouth                                * trolamine salicylate 10 % cream   Commonly known as:  ASPERCREME   Apply topically as needed for moderate pain                                * trolamine salicylate 10 % cream   Commonly known as:  ASPERCREME                                VALIUM PO   Take 5 mg by mouth every 6 hours as needed for anxiety                                WHEAT DEXTRIN PO   Take 1 tablet by mouth daily                                ZYRTEC ALLERGY 10 MG tablet   Take 10 mg by mouth daily   Generic drug:  cetirizine                                * Notice:  This list has 4 medication(s) that are the same as other medications prescribed for you. Read the directions carefully, and ask your doctor or other care provider to review them with you.

## 2018-06-26 ENCOUNTER — OFFICE VISIT (OUTPATIENT)
Dept: OPHTHALMOLOGY | Facility: CLINIC | Age: 63
End: 2018-06-26
Attending: OPHTHALMOLOGY
Payer: MEDICARE

## 2018-06-26 DIAGNOSIS — Z96.1 PSEUDOPHAKIA OF BOTH EYES: ICD-10-CM

## 2018-06-26 DIAGNOSIS — Z98.890 POSTOPERATIVE EYE STATE: Primary | ICD-10-CM

## 2018-06-26 PROCEDURE — G0463 HOSPITAL OUTPT CLINIC VISIT: HCPCS | Mod: ZF

## 2018-06-26 ASSESSMENT — CONF VISUAL FIELD
OS_NORMAL: 1
OD_NORMAL: 1
METHOD: COUNTING FINGERS

## 2018-06-26 ASSESSMENT — TONOMETRY
OD_IOP_MMHG: 14
IOP_METHOD: ICARE
OS_IOP_MMHG: 14

## 2018-06-26 ASSESSMENT — VISUAL ACUITY
OD_SC: 20/30
METHOD: SNELLEN - LINEAR
OS_SC: 20/30

## 2018-06-26 ASSESSMENT — SLIT LAMP EXAM - LIDS: COMMENTS: NORMAL

## 2018-06-26 ASSESSMENT — EXTERNAL EXAM - RIGHT EYE: OD_EXAM: NORMAL

## 2018-06-26 ASSESSMENT — EXTERNAL EXAM - LEFT EYE: OS_EXAM: NORMAL

## 2018-06-26 NOTE — NURSING NOTE
Chief Complaints and History of Present Illnesses   Patient presents with     Post Op (Ophthalmology) Right Eye     1st day PO Lens exchange RE     HPI    Affected eye(s):  Right   Symptoms:           Do you have eye pain now?:  No      Comments:  Pt notes lots of little floaters in vision x 1 day. No flashes with floaters. Little bit of an ache right now in RE. Vision is good in RE.    Dior Ross@ [facility] 10:51 AM June 26, 2018

## 2018-06-26 NOTE — MR AVS SNAPSHOT
After Visit Summary   6/26/2018    Elham Naik    MRN: [id number]           Patient Information     Date Of Birth          1955        Visit Information        Provider Department      6/26/2018 10:15 AM Keith Lane MD Eye Clinic        Today's Diagnoses     Postoperative eye state    -  1    Pseudophakia of both eyes           Follow-ups after your visit        Follow-up notes from your care team     Return in about 1 week (around 7/3/2018).      Your next 10 appointments already scheduled     Jul 02, 2018 10:00 AM CDT   Post-Op with Ilan Zurita DO   Eye Clinic ([facility])    [address] [place] [address] [phone]            Jul 24, 2018  1:45 PM CDT   Post-Op with Keith Lane MD   Eye Clinic ([facility])    [address] [place] [address] [phone]              Who to contact     Please call your clinic at [phone] to:    Ask questions about your health    Make or cancel appointments    Discuss your medicines    Learn about your test results    Speak to your doctor            Additional Information About Your Visit        Care EveryWhere ID     This is your Care EveryWhere ID. This could be used by other organizations to access your [facility] medical records  [id number]        Your Vitals Were     Last Period                   (LMP Unknown)            Blood Pressure from Last 3 Encounters:   06/25/18 140/72   04/24/17 156/84   01/13/17 130/76    Weight from Last 3 Encounters:   06/22/18 96.2 kg (212 lb)   02/19/18 93.8 kg (206 lb 12.8 oz)   12/12/17 92.9 kg (204 lb 12.9 oz)              We Performed the Following     Mammogram - HIM Scan        Primary Care Provider Office Phone # Fax #    [facility] [phone] [address] [facility] [address]  [facility] [address]        Equal  Access to Services     [facility] : Hadii aad ku hadashleymagui Blairali, waalleyda luqadaha, qasruthi kaportillovanessa staley. So [facility] [phone].    ATENCIÓN: Si habla español, tiene a watters disposición servicios gratuitos de asistencia lingüística. Llame al [phone].    We comply with applicable federal civil rights laws and [place] laws. We do not discriminate on the basis of race, color, national origin, age, disability, sex, sexual orientation, or gender identity.            Thank you!     Thank you for choosing EYE CLINIC  for your care. Our goal is always to provide you with excellent care. Hearing back from our patients is one way we can continue to improve our services. Please take a few minutes to complete the written survey that you may receive in the mail after your visit with us. Thank you!             Your Updated Medication List - Protect others around you: Learn how to safely use, store and throw away your medicines at www.disposemymeds.org.          This list is accurate as of 6/26/18 11:59 PM.  Always use your most recent med list.                   Brand Name Dispense Instructions for use Diagnosis    aspirin 81 MG tablet      Take 81 mg by mouth daily        CALCIUM CARBONATE PO      Take by mouth as needed        cholecalciferol 1000 units Tabs      Take 1,000 Units by mouth daily        codeine 15 MG tablet      Take 15 mg by mouth At Bedtime        escitalopram 5 MG/5ML solution    LEXAPRO     Take 10 mg by mouth daily        ferrous sulfate 325 (65 Fe) MG Tbec EC tablet      Take 325 mg by mouth daily        FUROSEMIDE PO      Take 20 mg by mouth daily        GABAPENTIN PO      Take 300 mg by mouth 3 times daily        guaiFENesin-codeine 100-10 MG/5ML Soln solution    ROBITUSSIN AC     Take 1 tsp. by mouth 2 times daily as needed for cough        hydrOXYzine 50 MG tablet    ATARAX     Take 50 mg by mouth every 6 hours as needed for anxiety        * INSULIN  ASPART SC      Inject 3 Units Subcutaneous as needed for high blood sugar        * insulin aspart 100 UNIT/ML injection    NovoLOG FLEXPEN    3 mL    Inject 3 Units Subcutaneous 3 times daily (with meals) Per sliding scale    Type 2 diabetes mellitus with hyperglycemia, unspecified long term insulin use status (H)       insulin degludec 100 UNIT/ML pen    TRESIBA     Inject 30 Units Subcutaneous daily        insulin glargine 100 UNIT/ML injection    LANTUS     Inject 38 Units Subcutaneous At Bedtime        insulin pen needle 31G X 5 MM    B-D U/F    100 each    Use 1 pen needles daily or as directed with Victoza    Obesity       ketorolac 0.5 % ophthalmic solution    ACULAR    1 Bottle    Place 1 drop into the right eye 4 times daily    Mechanical complication due to intraocular lens implant, subsequent encounter       levothyroxine 50 MCG tablet    SYNTHROID/LEVOTHROID     Take by mouth daily        liraglutide 18 MG/3ML soln    VICTOZA    9 mL    1.8 MG Daily    Morbid obesity, unspecified obesity type (H)       loperamide 2 MG capsule    IMODIUM     Take 2 mg by mouth 4 times daily as needed for diarrhea        meclizine 12.5 MG tablet    ANTIVERT     Take 12.5 mg by mouth 3 times daily as needed for dizziness        MELATONIN PO      Take 10 mg by mouth At Bedtime        ofloxacin 0.3 % ophthalmic solution    OCUFLOX    1 Bottle    Place 1 drop into the right eye 4 times daily    Mechanical complication due to intraocular lens implant, subsequent encounter       prednisoLONE acetate 1 % ophthalmic susp    PRED FORTE    1 Bottle    Place 1 drop into the right eye 4 times daily    Mechanical complication due to intraocular lens implant, subsequent encounter       simvastatin 40 MG tablet    ZOCOR     Take by mouth At Bedtime        topiramate 25 MG tablet    TOPAMAX    120 tablet    Take 2 tablets (50 mg) by mouth 2 times daily    Morbid obesity due to excess calories (H)       TOPROL XL PO      Take 50 mg by  mouth        * trolamine salicylate 10 % cream    ASPERCREME     Apply topically as needed for moderate pain        * trolamine salicylate 10 % cream    ASPERCREME          VALIUM PO      Take 5 mg by mouth every 6 hours as needed for anxiety        WHEAT DEXTRIN PO      Take 1 tablet by mouth daily        ZYRTEC ALLERGY 10 MG tablet   Generic drug:  cetirizine      Take 10 mg by mouth daily        * Notice:  This list has 4 medication(s) that are the same as other medications prescribed for you. Read the directions carefully, and ask your doctor or other care provider to review them with you.

## 2018-06-26 NOTE — PROGRESS NOTES
Assessment / Plan:    Elham Naik is a 63 year old female who is 1 day s/p IOL exchange with 3-piece lens placed in the sulcus, right eye  Off to a good start.    Medications in the surgical eye:    prednisolone acetate 1% four times a day     Ketorolac 0.4% four times a day     Ofloxacin four times a day      Limit heavy lifting, bending over, and heavy exertion. Light aerobic activity is acceptable. Avoid swimming pools, hot tubs, or saunas for 3-4 weeks.   Wear the protective shield at night for the next seven days, and call for increased redness, discharge, pain, or decreased vision.    Return to clinic in approximately 1 week, earlier as needed.    Jai Gregory MD  PGY3, Dept of Ophthalmology  Pager [phone]      ~~~~~~~~~~~~~~~~~~~~~~~~~~~~~~~~~~~~~~~~~~~~~~~~~~~~~~~~~~~~~~~~    Complete documentation of historical and exam elements from today's encounter can be found in the full encounter summary report (not reduplicated in this progress note). I personally obtained the chief complaint(s) and history of present illness.  I confirmed and edited as necessary the review of systems, past medical/surgical history, family history, social history, and examination findings as documented by others.  I examined the patient myself, and I personally reviewed the relevant tests, images, and reports as documented above. I formulated and edited as necessary the assessment and plan and discussed the findings and management plan with the patient and family.     Keith Lane MD, MA  Director, Cornea & Anterior Segment  [facility] Department of Ophthalmology & Visual Neuroscience

## 2018-07-02 ENCOUNTER — OFFICE VISIT (OUTPATIENT)
Dept: OPHTHALMOLOGY | Facility: CLINIC | Age: 63
End: 2018-07-02
Attending: OPHTHALMOLOGY
Payer: MEDICARE

## 2018-07-02 DIAGNOSIS — Z98.890 POSTOPERATIVE EYE STATE: Primary | ICD-10-CM

## 2018-07-02 PROCEDURE — G0463 HOSPITAL OUTPT CLINIC VISIT: HCPCS | Mod: ZF

## 2018-07-02 ASSESSMENT — TONOMETRY
OD_IOP_MMHG: 22
OS_IOP_MMHG: 17
IOP_METHOD: PALPATION
IOP_METHOD: ICARE
OD_IOP_MMHG: 22

## 2018-07-02 ASSESSMENT — VISUAL ACUITY
OS_SC+: -3
METHOD: SNELLEN - LINEAR
OD_SC: 20/40
OS_SC: 20/20
OD_PH_SC: 20/30-1

## 2018-07-02 ASSESSMENT — EXTERNAL EXAM - LEFT EYE: OS_EXAM: NORMAL

## 2018-07-02 ASSESSMENT — SLIT LAMP EXAM - LIDS: COMMENTS: NORMAL

## 2018-07-02 ASSESSMENT — EXTERNAL EXAM - RIGHT EYE: OD_EXAM: NORMAL

## 2018-07-02 NOTE — MR AVS SNAPSHOT
After Visit Summary   7/2/2018    Elham Naik    MRN: [id number]           Patient Information     Date Of Birth          1955        Visit Information        Provider Department      7/2/2018 10:00 AM Ilan Zurita,  Eye Clinic        Today's Diagnoses     Postoperative eye state    -  1       Follow-ups after your visit        Your next 10 appointments already scheduled     Jul 24, 2018  1:45 PM CDT   Post-Op with Keith Lane MD   Eye Clinic ([facility])    [facility] [address] [facility] [address] [place] [id number] [phone]              Who to contact     Please call your clinic at [phone] to:    Ask questions about your health    Make or cancel appointments    Discuss your medicines    Learn about your test results    Speak to your doctor            Additional Information About Your Visit        Care EveryWhere ID     This is your Care EveryWhere ID. This could be used by other organizations to access your [place] medical records  [id number]        Your Vitals Were     Last Period                   (LMP Unknown)            Blood Pressure from Last 3 Encounters:   06/25/18 140/72   04/24/17 156/84   01/13/17 130/76    Weight from Last 3 Encounters:   06/22/18 96.2 kg (212 lb)   02/19/18 93.8 kg (206 lb 12.8 oz)   12/12/17 92.9 kg (204 lb 12.9 oz)              Today, you had the following     No orders found for display       Primary Care Provider Office Phone # Fax #    [facility] [place] [facility] [phone] [address] [place] [address]        Equal Access to Services     VALERIE IYER AH: Hadii aad ku hadasho Soomaali, waaxda luqadaha, qaybta kaalmada adeegyada, vanessa lambert [facility]. So [facility] [phone].    ATENCIÓN: Si habla español, tiene a watters disposición servicios gratuitos de asistencia lingüística. Llame al [phone].    We comply with applicable  federal civil rights laws and [place] laws. We do not discriminate on the basis of race, color, national origin, age, disability, sex, sexual orientation, or gender identity.            Thank you!     Thank you for choosing EYE CLINIC  for your care. Our goal is always to provide you with excellent care. Hearing back from our patients is one way we can continue to improve our services. Please take a few minutes to complete the written survey that you may receive in the mail after your visit with us. Thank you!             Your Updated Medication List - Protect others around you: Learn how to safely use, store and throw away your medicines at www.disposemymeds.org.          This list is accurate as of 7/2/18 11:59 PM.  Always use your most recent med list.                   Brand Name Dispense Instructions for use Diagnosis    aspirin 81 MG tablet      Take 81 mg by mouth daily        CALCIUM CARBONATE PO      Take by mouth as needed        cholecalciferol 1000 units Tabs      Take 1,000 Units by mouth daily        codeine 15 MG tablet      Take 15 mg by mouth At Bedtime        escitalopram 5 MG/5ML solution    LEXAPRO     Take 10 mg by mouth daily        ferrous sulfate 325 (65 Fe) MG Tbec EC tablet      Take 325 mg by mouth daily        FUROSEMIDE PO      Take 20 mg by mouth daily        GABAPENTIN PO      Take 300 mg by mouth 3 times daily        guaiFENesin-codeine 100-10 MG/5ML Soln solution    ROBITUSSIN AC     Take 1 tsp. by mouth 2 times daily as needed for cough        hydrOXYzine 50 MG tablet    ATARAX     Take 50 mg by mouth every 6 hours as needed for anxiety        * INSULIN ASPART SC      Inject 3 Units Subcutaneous as needed for high blood sugar        * insulin aspart 100 UNIT/ML injection    NovoLOG FLEXPEN    3 mL    Inject 3 Units Subcutaneous 3 times daily (with meals) Per sliding scale    Type 2 diabetes mellitus with hyperglycemia, unspecified long term insulin use status (H)       insulin  degludec 100 UNIT/ML pen    TRESIBA     Inject 30 Units Subcutaneous daily        insulin glargine 100 UNIT/ML injection    LANTUS     Inject 38 Units Subcutaneous At Bedtime        insulin pen needle 31G X 5 MM    B-D U/F    100 each    Use 1 pen needles daily or as directed with Victoza    Obesity       ketorolac 0.5 % ophthalmic solution    ACULAR    1 Bottle    Place 1 drop into the right eye 4 times daily    Mechanical complication due to intraocular lens implant, subsequent encounter       levothyroxine 50 MCG tablet    SYNTHROID/LEVOTHROID     Take by mouth daily        liraglutide 18 MG/3ML soln    VICTOZA    9 mL    1.8 MG Daily    Morbid obesity, unspecified obesity type (H)       loperamide 2 MG capsule    IMODIUM     Take 2 mg by mouth 4 times daily as needed for diarrhea        meclizine 12.5 MG tablet    ANTIVERT     Take 12.5 mg by mouth 3 times daily as needed for dizziness        MELATONIN PO      Take 10 mg by mouth At Bedtime        ofloxacin 0.3 % ophthalmic solution    OCUFLOX    1 Bottle    Place 1 drop into the right eye 4 times daily    Mechanical complication due to intraocular lens implant, subsequent encounter       prednisoLONE acetate 1 % ophthalmic susp    PRED FORTE    1 Bottle    Place 1 drop into the right eye 4 times daily    Mechanical complication due to intraocular lens implant, subsequent encounter       simvastatin 40 MG tablet    ZOCOR     Take by mouth At Bedtime        topiramate 25 MG tablet    TOPAMAX    120 tablet    Take 2 tablets (50 mg) by mouth 2 times daily    Morbid obesity due to excess calories (H)       TOPROL XL PO      Take 50 mg by mouth        * trolamine salicylate 10 % cream    ASPERCREME     Apply topically as needed for moderate pain        * trolamine salicylate 10 % cream    ASPERCREME          VALIUM PO      Take 5 mg by mouth every 6 hours as needed for anxiety        WHEAT DEXTRIN PO      Take 1 tablet by mouth daily        ZYRTEC ALLERGY 10 MG  tablet   Generic drug:  cetirizine      Take 10 mg by mouth daily        * Notice:  This list has 4 medication(s) that are the same as other medications prescribed for you. Read the directions carefully, and ask your doctor or other care provider to review them with you.

## 2018-07-02 NOTE — NURSING NOTE
Chief Complaints and History of Present Illnesses   Patient presents with     Post Op (Ophthalmology) Right Eye     POD#6 s/p IOL exchange with 3-piece lens placed in the sulcus, right eye     HPI    Affected eye(s):  Right   Symptoms:     No blurred vision (Comment: no changes per pt)   No floaters (Comment: patient states floaters have gotten better )   No flashes   No tearing   No Dryness         Do you have eye pain now?:  Yes   Location:  OD   Pain Level:  Mild Pain (2)   Pain Duration:  6 days   Pain Frequency:  Intermittent   Pain Characteristics:  Aching      Comments:    Patient is using:  Ofloxacin QID RE  Pred QID RE  Ketorolac QID RE    Mely River July 2, 2018 10:21 AM

## 2018-07-02 NOTE — PROGRESS NOTES
Assessment / Plan:    Elham Naik is a 63 year old female who is 1 week s/p IOL exchange with 3-piece lens placed in the sulcus, right eye  Off to a good start.    Medications in the surgical eye:    prednisolone acetate 1% taper   Ketorolac 0.4% taper   DC oflox    OK to discontinue eye shield  OK to resume normal activity  Avoid swimming pools, hot tubs, saunas for 3 additional weeks    Return to clinic in approximately 3-4 weeks, earlier as needed.  Plan dilated fundus exam and manifest refraction in the operative eye at next visit.    Complete documentation of historical and exam elements from today's encounter can be found in the full encounter summary report (not reduplicated in this progress note). I personally obtained the chief complaint(s) and history of present illness.  I confirmed and edited as necessary the review of systems, past medical/surgical history, family history, social history, and examination findings as documented by others; and I examined the patient myself. I personally reviewed the relevant tests, images, and reports as documented above. I formulated and edited as necessary the assessment and plan and discussed the findings and management plan with the patient and family.     Ilan Zurita,

## 2018-07-24 ENCOUNTER — OFFICE VISIT (OUTPATIENT)
Dept: OPHTHALMOLOGY | Facility: CLINIC | Age: 63
End: 2018-07-24
Attending: OPHTHALMOLOGY
Payer: MEDICARE

## 2018-07-24 DIAGNOSIS — Z96.1 PSEUDOPHAKIA: ICD-10-CM

## 2018-07-24 DIAGNOSIS — Z98.890 POSTOPERATIVE EYE STATE: Primary | ICD-10-CM

## 2018-07-24 PROCEDURE — G0463 HOSPITAL OUTPT CLINIC VISIT: HCPCS | Mod: 25,ZF

## 2018-07-24 PROCEDURE — 92015 DETERMINE REFRACTIVE STATE: CPT | Mod: GY,ZF

## 2018-07-24 ASSESSMENT — VISUAL ACUITY
OS_SC: 20/25
OS_CC: 20/20
OD_CC: 20/50
OD_CC+: +2
OD_SC: 20/30
METHOD: SNELLEN - LINEAR
OD_SC+: +2

## 2018-07-24 ASSESSMENT — REFRACTION_MANIFEST
OD_CYLINDER: +0.50
OD_AXIS: 020
OD_SPHERE: -1.00
OS_CYLINDER: +0.50
OD_ADD: +2.50
OS_AXIS: 060
OS_ADD: +2.50
OS_SPHERE: -1.00

## 2018-07-24 ASSESSMENT — REFRACTION_WEARINGRX
OS_SPHERE: -0.50
OS_AXIS: 045
OD_CYLINDER: +0.25
OD_AXIS: 145
OS_CYLINDER: +0.50
SPECS_TYPE: PAL
OD_SPHERE: +0.25

## 2018-07-24 ASSESSMENT — CONF VISUAL FIELD
OS_NORMAL: 1
OD_NORMAL: 1

## 2018-07-24 ASSESSMENT — TONOMETRY
OS_IOP_MMHG: 12
OD_IOP_MMHG: 10
IOP_METHOD: ICARE

## 2018-07-24 ASSESSMENT — EXTERNAL EXAM - RIGHT EYE: OD_EXAM: NORMAL

## 2018-07-24 ASSESSMENT — SLIT LAMP EXAM - LIDS: COMMENTS: NORMAL

## 2018-07-24 ASSESSMENT — EXTERNAL EXAM - LEFT EYE: OS_EXAM: NORMAL

## 2018-07-24 NOTE — LETTER
7/24/2018       RE: Elham Naik  [facility] [address] [place] [address]     Dear Dr. Jacques,    Thank you for referring your patient, Elham Naik, to the EYE CLINIC at [facility]. Please see a copy of my visit note below.    Assessment / Plan:    Elham Naik is a 63 year old female who is 1 month s/p IOL exchange with 3-piece lens placed in the sulcus, right eye    Doing well     Off drops    manifest refraction today: best corrected visual acuity 20/25    To see Dr. Jacques at [facility] in August    F/u here as needed     Letter to Dr. Jacques      ~~~~~~~~~~~~~~~~~~~~~~~~~~~~~~~~~~~~~~~~~~~~~~~~~~~~~~~~~~~~~~~~    Ms. Gumaro is much happier with her vision post intraocular lens exchange.  We were able to keep the anterior lens capsule and stick with a sulcus intraocular lens; she is doing well so far.      Again, thank you for allowing me to participate in the care of your patient.      Sincerely,        Keith Lane MD, MA  Director, Cornea & Anterior Segment  Director, Fellowship in Cornea & External Disease  [facility] Department of Ophthalmology & Visual Neuroscience  : Radha Florian [phone]  Email: [email or web address]  Mobile: [phone]

## 2018-07-24 NOTE — PROGRESS NOTES
Assessment / Plan:    Elham Naik is a 63 year old female who is 1 month s/p IOL exchange with 3-piece lens placed in the sulcus, right eye    Doing well     Off drops    manifest refraction today: best corrected visual acuity 20/25    To see Dr. Jacques at [facility] in August    F/u here as needed     Letter to Dr. Jacques      ~~~~~~~~~~~~~~~~~~~~~~~~~~~~~~~~~~~~~~~~~~~~~~~~~~~~~~~~~~~~~~~~    Complete documentation of historical and exam elements from today's encounter can be found in the full encounter summary report (not reduplicated in this progress note). I personally obtained the chief complaint(s) and history of present illness.  I confirmed and edited as necessary the review of systems, past medical/surgical history, family history, social history, and examination findings as documented by others.  I examined the patient myself, and I personally reviewed the relevant tests, images, and reports as documented above. I formulated and edited as necessary the assessment and plan and discussed the findings and management plan with the patient and family.     Keith Lane MD, MA  Director, Cornea & Anterior Segment  [facility] Department of Ophthalmology & Visual Neuroscience

## 2018-07-24 NOTE — NURSING NOTE
Chief Complaints and History of Present Illnesses   Patient presents with     Eye Problem     1 month status post-op     HPI    Symptoms:              Comments:  Elham Naik is a 63 year old female who is 1 month s/p IOL exchange with 3-piece lens placed in the sulcus, right eye    Happy with surgical results  Feels vision improved significantly  Healed well, but voice did not recover.    Vickey WHEAT 2:27 PM July 24, 2018

## 2018-07-24 NOTE — MR AVS SNAPSHOT
After Visit Summary   7/24/2018    Elham Naik    MRN: [id number]           Patient Information     Date Of Birth          1955        Visit Information        Provider Department      7/24/2018 1:45 PM Keith Lane MD Eye Clinic        Today's Diagnoses     Postoperative eye state    -  1    Pseudophakia           Follow-ups after your visit        Who to contact     Please call your clinic at [phone] to:    Ask questions about your health    Make or cancel appointments    Discuss your medicines    Learn about your test results    Speak to your doctor            Additional Information About Your Visit        Care EveryWhere ID     This is your Care EveryWhere ID. This could be used by other organizations to access your [place] medical records  [id number]        Your Vitals Were     Last Period                   (LMP Unknown)            Blood Pressure from Last 3 Encounters:   06/25/18 140/72   04/24/17 156/84   01/13/17 130/76    Weight from Last 3 Encounters:   06/22/18 96.2 kg (212 lb)   02/19/18 93.8 kg (206 lb 12.8 oz)   12/12/17 92.9 kg (204 lb 12.9 oz)              Today, you had the following     No orders found for display       Primary Care Provider Office Phone # Fax #    [facility] [place] [facility] [phone] [address] [place] [address]        Equal Access to Services     VALERIE IYER AH: Hadii aad ku hadasho Soomaali, waaxda luqadaha, qaybta kaalmada adeegyada, waxay idiin hayaan chaitanya khhong [place] . So [facility] [phone].    ATENCIÓN: Si habla español, tiene a watters disposición servicios gratmiloos de asistencia lingüística. Llame al [phone].    We comply with applicable federal civil rights laws and [place] laws. We do not discriminate on the basis of race, color, national origin, age, disability, sex, sexual orientation, or gender identity.            Thank you!     Thank you for choosing EYE CLINIC  for your  care. Our goal is always to provide you with excellent care. Hearing back from our patients is one way we can continue to improve our services. Please take a few minutes to complete the written survey that you may receive in the mail after your visit with us. Thank you!             Your Updated Medication List - Protect others around you: Learn how to safely use, store and throw away your medicines at www.disposemymeds.org.          This list is accurate as of 7/24/18 11:59 PM.  Always use your most recent med list.                   Brand Name Dispense Instructions for use Diagnosis    aspirin 81 MG tablet      Take 81 mg by mouth daily        CALCIUM CARBONATE PO      Take by mouth as needed        cholecalciferol 1000 units Tabs      Take 1,000 Units by mouth daily        codeine 15 MG tablet      Take 15 mg by mouth At Bedtime        escitalopram 5 MG/5ML solution    LEXAPRO     Take 10 mg by mouth daily        ferrous sulfate 325 (65 Fe) MG Tbec EC tablet      Take 325 mg by mouth daily        FUROSEMIDE PO      Take 20 mg by mouth daily        GABAPENTIN PO      Take 300 mg by mouth 3 times daily        guaiFENesin-codeine 100-10 MG/5ML Soln solution    ROBITUSSIN AC     Take 1 tsp. by mouth 2 times daily as needed for cough        hydrOXYzine 50 MG tablet    ATARAX     Take 50 mg by mouth every 6 hours as needed for anxiety        * INSULIN ASPART SC      Inject 3 Units Subcutaneous as needed for high blood sugar        * insulin aspart 100 UNIT/ML injection    NovoLOG FLEXPEN    3 mL    Inject 3 Units Subcutaneous 3 times daily (with meals) Per sliding scale    Type 2 diabetes mellitus with hyperglycemia, unspecified long term insulin use status (H)       insulin degludec 100 UNIT/ML pen    TRESIBA     Inject 30 Units Subcutaneous daily        insulin glargine 100 UNIT/ML injection    LANTUS     Inject 38 Units Subcutaneous At Bedtime        insulin pen needle 31G X 5 MM    B-D U/F    100 each    Use 1 pen  needles daily or as directed with Victoza    Obesity       ketorolac 0.5 % ophthalmic solution    ACULAR    1 Bottle    Place 1 drop into the right eye 4 times daily    Mechanical complication due to intraocular lens implant, subsequent encounter       levothyroxine 50 MCG tablet    SYNTHROID/LEVOTHROID     Take by mouth daily        liraglutide 18 MG/3ML soln    VICTOZA    9 mL    1.8 MG Daily    Morbid obesity, unspecified obesity type (H)       loperamide 2 MG capsule    IMODIUM     Take 2 mg by mouth 4 times daily as needed for diarrhea        meclizine 12.5 MG tablet    ANTIVERT     Take 12.5 mg by mouth 3 times daily as needed for dizziness        MELATONIN PO      Take 10 mg by mouth At Bedtime        ofloxacin 0.3 % ophthalmic solution    OCUFLOX    1 Bottle    Place 1 drop into the right eye 4 times daily    Mechanical complication due to intraocular lens implant, subsequent encounter       prednisoLONE acetate 1 % ophthalmic susp    PRED FORTE    1 Bottle    Place 1 drop into the right eye 4 times daily    Mechanical complication due to intraocular lens implant, subsequent encounter       simvastatin 40 MG tablet    ZOCOR     Take by mouth At Bedtime        topiramate 25 MG tablet    TOPAMAX    120 tablet    Take 2 tablets (50 mg) by mouth 2 times daily    Morbid obesity due to excess calories (H)       TOPROL XL PO      Take 50 mg by mouth        * trolamine salicylate 10 % cream    ASPERCREME     Apply topically as needed for moderate pain        * trolamine salicylate 10 % cream    ASPERCREME          VALIUM PO      Take 5 mg by mouth every 6 hours as needed for anxiety        WHEAT DEXTRIN PO      Take 1 tablet by mouth daily        ZYRTEC ALLERGY 10 MG tablet   Generic drug:  cetirizine      Take 10 mg by mouth daily        * Notice:  This list has 4 medication(s) that are the same as other medications prescribed for you. Read the directions carefully, and ask your doctor or other care provider to  review them with you.

## 2019-09-18 ENCOUNTER — RECORDS - HEALTHEAST (OUTPATIENT)
Dept: LAB | Facility: CLINIC | Age: 64
End: 2019-09-18

## 2019-09-20 ENCOUNTER — RECORDS - HEALTHEAST (OUTPATIENT)
Dept: LAB | Facility: CLINIC | Age: 64
End: 2019-09-20

## 2019-09-23 LAB
ANION GAP SERPL CALCULATED.3IONS-SCNC: 8 MMOL/L (ref 5–18)
BUN SERPL-MCNC: 36 MG/DL (ref 8–22)
CALCIUM SERPL-MCNC: 8.6 MG/DL (ref 8.5–10.5)
CHLORIDE BLD-SCNC: 110 MMOL/L (ref 98–107)
CO2 SERPL-SCNC: 22 MMOL/L (ref 22–31)
CREAT SERPL-MCNC: 2.28 MG/DL (ref 0.6–1.1)
ERYTHROCYTE [DISTWIDTH] IN BLOOD BY AUTOMATED COUNT: 13.9 % (ref 11–14.5)
GAMMA INTERFERON BACKGROUND BLD IA-ACNC: 0.03 IU/ML
GFR SERPL CREATININE-BSD FRML MDRD: 22 ML/MIN/1.73M2
GLUCOSE BLD-MCNC: 114 MG/DL (ref 70–125)
HCT VFR BLD AUTO: 34.2 % (ref 35–47)
HGB BLD-MCNC: 10 G/DL (ref 12–16)
M TB IFN-G BLD-IMP: NEGATIVE
MCH RBC QN AUTO: 30 PG (ref 27–34)
MCHC RBC AUTO-ENTMCNC: 29.2 G/DL (ref 32–36)
MCV RBC AUTO: 103 FL (ref 80–100)
MITOGEN IGNF BCKGRD COR BLD-ACNC: 0 IU/ML
MITOGEN IGNF BCKGRD COR BLD-ACNC: 0 IU/ML
PLATELET # BLD AUTO: 227 THOU/UL (ref 140–440)
PMV BLD AUTO: 11.2 FL (ref 8.5–12.5)
POTASSIUM BLD-SCNC: 4.2 MMOL/L (ref 3.5–5)
QTF INTERPRETATION: NORMAL
QTF MITOGEN - NIL: >10 IU/ML
RBC # BLD AUTO: 3.33 MILL/UL (ref 3.8–5.4)
SODIUM SERPL-SCNC: 140 MMOL/L (ref 136–145)
WBC: 8.2 THOU/UL (ref 4–11)

## 2020-08-04 ENCOUNTER — EXTERNAL ORDER RESULTS (OUTPATIENT)
Dept: TRANSPLANT | Facility: CLINIC | Age: 65
End: 2020-08-04

## (undated) DEVICE — SYR 03ML LL W/O NDL 309657

## (undated) DEVICE — EYE PACK CUSTOM ANTERIOR 30DEG TIP CENTURION PPK6682-04

## (undated) DEVICE — NDL 19GA 1.5" FILTER 305200

## (undated) DEVICE — Device

## (undated) DEVICE — PACK CATARACT CUSTOM SO DALE SEY32CTFCX

## (undated) DEVICE — GLOVE PROTEXIS MICRO 7.0  2D73PM70

## (undated) DEVICE — TAPE MICROPORE 2"X1.5YD 1530S-2

## (undated) DEVICE — EYE SHIELD PLASTIC

## (undated) DEVICE — SU ETHILON 10-0 CS160-6 12" 9000G

## (undated) DEVICE — GLOVE PROTEXIS MICRO 7.5  2D73PM75

## (undated) DEVICE — LINEN TOWEL PACK X5 5464

## (undated) DEVICE — TAPE MICROPORE 1"X1.5YD 1530S-1

## (undated) DEVICE — EYE CANN IRR 30GA  ANTERIOR CHAMBER 581273

## (undated) DEVICE — GOWN LG DISP 9515

## (undated) DEVICE — EYE SOL BSS 500ML

## (undated) DEVICE — GLOVE PROTEXIS MICRO 8.0  2D73PM80

## (undated) RX ORDER — LIDOCAINE HYDROCHLORIDE 10 MG/ML
INJECTION, SOLUTION EPIDURAL; INFILTRATION; INTRACAUDAL; PERINEURAL
Status: DISPENSED
Start: 2018-06-25

## (undated) RX ORDER — TETRACAINE HYDROCHLORIDE 5 MG/ML
SOLUTION OPHTHALMIC
Status: DISPENSED
Start: 2018-06-25

## (undated) RX ORDER — LIDOCAINE HYDROCHLORIDE 20 MG/ML
INJECTION, SOLUTION EPIDURAL; INFILTRATION; INTRACAUDAL; PERINEURAL
Status: DISPENSED
Start: 2018-06-25

## (undated) RX ORDER — PROPOFOL 10 MG/ML
INJECTION, EMULSION INTRAVENOUS
Status: DISPENSED
Start: 2018-06-25

## (undated) RX ORDER — FENTANYL CITRATE 50 UG/ML
INJECTION, SOLUTION INTRAMUSCULAR; INTRAVENOUS
Status: DISPENSED
Start: 2018-06-25

## (undated) RX ORDER — ONDANSETRON 2 MG/ML
INJECTION INTRAMUSCULAR; INTRAVENOUS
Status: DISPENSED
Start: 2018-06-25